# Patient Record
Sex: FEMALE | Race: WHITE | NOT HISPANIC OR LATINO | Employment: FULL TIME | URBAN - METROPOLITAN AREA
[De-identification: names, ages, dates, MRNs, and addresses within clinical notes are randomized per-mention and may not be internally consistent; named-entity substitution may affect disease eponyms.]

---

## 2019-09-30 ENCOUNTER — OFFICE VISIT (OUTPATIENT)
Dept: PRIMARY CARE CLINIC | Facility: CLINIC | Age: 59
End: 2019-09-30
Payer: COMMERCIAL

## 2019-09-30 ENCOUNTER — IMMUNIZATION (OUTPATIENT)
Dept: PHARMACY | Facility: CLINIC | Age: 59
End: 2019-09-30
Payer: COMMERCIAL

## 2019-09-30 VITALS
DIASTOLIC BLOOD PRESSURE: 86 MMHG | WEIGHT: 167.75 LBS | HEART RATE: 61 BPM | SYSTOLIC BLOOD PRESSURE: 138 MMHG | OXYGEN SATURATION: 98 % | HEIGHT: 67 IN | BODY MASS INDEX: 26.33 KG/M2 | TEMPERATURE: 98 F

## 2019-09-30 DIAGNOSIS — Z01.89 ENCOUNTER FOR ROUTINE LABORATORY TESTING: ICD-10-CM

## 2019-09-30 DIAGNOSIS — F33.2 SEVERE EPISODE OF RECURRENT MAJOR DEPRESSIVE DISORDER, WITHOUT PSYCHOTIC FEATURES: ICD-10-CM

## 2019-09-30 DIAGNOSIS — Z13.220 SCREENING CHOLESTEROL LEVEL: ICD-10-CM

## 2019-09-30 DIAGNOSIS — M54.2 CERVICALGIA: ICD-10-CM

## 2019-09-30 DIAGNOSIS — Z12.11 ENCOUNTER FOR SCREENING COLONOSCOPY: ICD-10-CM

## 2019-09-30 DIAGNOSIS — Z23 NEED FOR INFLUENZA VACCINATION: ICD-10-CM

## 2019-09-30 DIAGNOSIS — Z01.818 PREOP EXAMINATION: ICD-10-CM

## 2019-09-30 DIAGNOSIS — M85.89 OSTEOPENIA OF MULTIPLE SITES: ICD-10-CM

## 2019-09-30 DIAGNOSIS — Z12.31 SCREENING MAMMOGRAM, ENCOUNTER FOR: ICD-10-CM

## 2019-09-30 DIAGNOSIS — F41.9 ANXIETY: ICD-10-CM

## 2019-09-30 DIAGNOSIS — E66.3 OVERWEIGHT (BMI 25.0-29.9): ICD-10-CM

## 2019-09-30 DIAGNOSIS — Z23 NEED FOR SHINGLES VACCINE: ICD-10-CM

## 2019-09-30 DIAGNOSIS — Z00.00 ANNUAL PHYSICAL EXAM: Primary | ICD-10-CM

## 2019-09-30 PROBLEM — Z78.0 POSTMENOPAUSAL: Status: ACTIVE | Noted: 2019-09-30

## 2019-09-30 LAB
BACTERIA #/AREA URNS AUTO: NORMAL /HPF
BILIRUB UR QL STRIP: NEGATIVE
CLARITY UR REFRACT.AUTO: CLEAR
COLOR UR AUTO: ABNORMAL
GLUCOSE UR QL STRIP: NEGATIVE
HGB UR QL STRIP: ABNORMAL
KETONES UR QL STRIP: NEGATIVE
LEUKOCYTE ESTERASE UR QL STRIP: NEGATIVE
MICROSCOPIC COMMENT: NORMAL
NITRITE UR QL STRIP: NEGATIVE
PH UR STRIP: 6 [PH] (ref 5–8)
PROT UR QL STRIP: NEGATIVE
RBC #/AREA URNS AUTO: 1 /HPF (ref 0–4)
SP GR UR STRIP: 1 (ref 1–1.03)
SQUAMOUS #/AREA URNS AUTO: 1 /HPF
URN SPEC COLLECT METH UR: ABNORMAL
WBC #/AREA URNS AUTO: 1 /HPF (ref 0–5)

## 2019-09-30 PROCEDURE — 90471 FLU VACCINE (QUAD) GREATER THAN OR EQUAL TO 3YO PRESERVATIVE FREE IM: ICD-10-PCS | Mod: S$GLB,,, | Performed by: FAMILY MEDICINE

## 2019-09-30 PROCEDURE — 93010 EKG 12-LEAD: ICD-10-PCS | Mod: S$GLB,,, | Performed by: INTERNAL MEDICINE

## 2019-09-30 PROCEDURE — 81001 URINALYSIS AUTO W/SCOPE: CPT

## 2019-09-30 PROCEDURE — 90686 IIV4 VACC NO PRSV 0.5 ML IM: CPT | Mod: S$GLB,,, | Performed by: FAMILY MEDICINE

## 2019-09-30 PROCEDURE — 93005 EKG 12-LEAD: ICD-10-PCS | Mod: S$GLB,,, | Performed by: FAMILY MEDICINE

## 2019-09-30 PROCEDURE — 99386 PREV VISIT NEW AGE 40-64: CPT | Mod: 25,S$GLB,, | Performed by: FAMILY MEDICINE

## 2019-09-30 PROCEDURE — 99386 PR PREVENTIVE VISIT,NEW,40-64: ICD-10-PCS | Mod: 25,S$GLB,, | Performed by: FAMILY MEDICINE

## 2019-09-30 PROCEDURE — 93005 ELECTROCARDIOGRAM TRACING: CPT | Mod: S$GLB,,, | Performed by: FAMILY MEDICINE

## 2019-09-30 PROCEDURE — 99999 PR PBB SHADOW E&M-NEW PATIENT-LVL V: CPT | Mod: PBBFAC,,, | Performed by: FAMILY MEDICINE

## 2019-09-30 PROCEDURE — 93010 ELECTROCARDIOGRAM REPORT: CPT | Mod: S$GLB,,, | Performed by: INTERNAL MEDICINE

## 2019-09-30 PROCEDURE — 99999 PR PBB SHADOW E&M-NEW PATIENT-LVL V: ICD-10-PCS | Mod: PBBFAC,,, | Performed by: FAMILY MEDICINE

## 2019-09-30 PROCEDURE — 90471 IMMUNIZATION ADMIN: CPT | Mod: S$GLB,,, | Performed by: FAMILY MEDICINE

## 2019-09-30 PROCEDURE — 90686 FLU VACCINE (QUAD) GREATER THAN OR EQUAL TO 3YO PRESERVATIVE FREE IM: ICD-10-PCS | Mod: S$GLB,,, | Performed by: FAMILY MEDICINE

## 2019-09-30 RX ORDER — CITALOPRAM 10 MG/1
1 TABLET ORAL DAILY PRN
COMMUNITY
Start: 2013-01-01 | End: 2019-09-30 | Stop reason: SDUPTHER

## 2019-09-30 RX ORDER — CITALOPRAM 10 MG/1
10 TABLET ORAL DAILY
Qty: 90 TABLET | Refills: 3 | Status: SHIPPED | OUTPATIENT
Start: 2019-09-30 | End: 2020-06-02 | Stop reason: DRUGHIGH

## 2019-09-30 RX ORDER — CYCLOBENZAPRINE HCL 10 MG
TABLET ORAL
Refills: 0 | COMMUNITY
Start: 2019-09-17 | End: 2020-10-01 | Stop reason: SDUPTHER

## 2019-09-30 RX ORDER — NAPROXEN 500 MG/1
TABLET ORAL
Refills: 0 | COMMUNITY
Start: 2019-09-17 | End: 2021-06-03 | Stop reason: ALTCHOICE

## 2019-09-30 NOTE — PROGRESS NOTES
Two patient identifiers used. Allergies reviewed: Penicillins, Cephalexin. Injection x1 given. Patient tolerated well. VIS given.   Answers for HPI/ROS submitted by the patient on 9/26/2019   activity change: No  unexpected weight change: No  neck pain: Yes  hearing loss: No  rhinorrhea: No  trouble swallowing: No  eye discharge: No  visual disturbance: No  chest tightness: No  wheezing: No  chest pain: No  palpitations: No  blood in stool: No  constipation: No  vomiting: No  diarrhea: No  polydipsia: No  polyuria: No  difficulty urinating: Yes  hematuria: No  menstrual problem: No  dysuria: No  joint swelling: No  arthralgias: No  headaches: Yes  weakness: No  confusion: No  dysphoric mood: Yes

## 2019-09-30 NOTE — PATIENT INSTRUCTIONS

## 2019-09-30 NOTE — PROGRESS NOTES
Subjective:       Patient ID: Linda Kahn is a 59 y.o. female.    Chief Complaint: Annual Exam and Establish Care    58 yo female presents for annual physical exam.    She is new to [facility] having received care at other facilities (cannot remember the name, states her primary care provider moved around a lot).    She has a past medical history of cervical disc degeneration; work related injury on 9/15/2019 secondary to a slip and fall with resultant right knee injury (sees Dr. Shah at [facility]); postmenopausal; depression; osteopenia and overweight with BMI of 26.28.    She would like a refill on Celexa. She is not interested in seeing a psychiatrist but would be interested in talk therapy.    The following portions of the patient's history were reviewed and updated as appropriate: allergies, current medications, past family history, past medical history, past social history, past surgical history and problem list.      Review of Systems   Constitutional: Negative for activity change, appetite change, chills, diaphoresis, fatigue, fever and unexpected weight change.   HENT: Negative for congestion, dental problem, facial swelling, hearing loss, nosebleeds, postnasal drip, rhinorrhea, sinus pain, sore throat, tinnitus and trouble swallowing.    Eyes: Negative for pain, discharge, itching and visual disturbance.   Respiratory: Negative for apnea, cough, choking, chest tightness, shortness of breath, wheezing and stridor.    Cardiovascular: Negative for chest pain, palpitations and leg swelling.   Gastrointestinal: Negative for abdominal distention, abdominal pain, blood in stool, constipation, diarrhea, nausea, rectal pain and vomiting.   Endocrine: Negative for cold intolerance, heat intolerance, polydipsia and polyuria.   Genitourinary: Positive for difficulty urinating. Negative for dysuria, frequency, hematuria, menstrual problem and urgency.   Musculoskeletal: Positive for arthralgias and neck pain.  "Negative for gait problem, joint swelling, myalgias and neck stiffness.   Skin: Negative for color change and rash.   Neurological: Positive for headaches. Negative for dizziness, tremors, seizures, syncope, facial asymmetry and weakness.   Hematological: Negative for adenopathy. Does not bruise/bleed easily.   Psychiatric/Behavioral: Positive for dysphoric mood. Negative for agitation, confusion, hallucinations, self-injury and suicidal ideas. The patient is not hyperactive.        Objective:       Vitals:    09/30/19 0810   BP: 138/86   Pulse: 61   Temp: 98.4 °F (36.9 °C)   TempSrc: Oral   SpO2: 98%   Weight: 76.1 kg (167 lb 12.3 oz)   Height: 5' 7" (1.702 m)     Physical Exam   Constitutional: She is oriented to person, place, and time. She appears well-developed and well-nourished. No distress.   HENT:   Head: Normocephalic and atraumatic.   Right Ear: External ear normal.   Left Ear: External ear normal.   Eyes: Pupils are equal, round, and reactive to light. Conjunctivae and EOM are normal. Right eye exhibits no discharge. Left eye exhibits no discharge. No scleral icterus.   Neck: Normal range of motion. Neck supple. No thyromegaly present.   Cardiovascular: Normal rate, regular rhythm, normal heart sounds and intact distal pulses. Exam reveals no gallop and no friction rub.   No murmur heard.  Pulmonary/Chest: Effort normal and breath sounds normal. No respiratory distress. She exhibits no tenderness.   Abdominal: Soft. Bowel sounds are normal. She exhibits no distension and no mass. There is no tenderness. There is no rebound and no guarding.   Lymphadenopathy:     She has no cervical adenopathy.   Neurological: She is alert and oriented to person, place, and time.   Skin: Skin is warm. Capillary refill takes less than 2 seconds. No rash noted. She is not diaphoretic.   Psychiatric: Judgment and thought content normal.   Maintains good eye contact.       Assessment:       1. Annual physical exam    2. " Severe episode of recurrent major depressive disorder, without psychotic features    3. Anxiety    4. Cervicalgia    5. Osteopenia of multiple sites    6. Overweight (BMI 25.0-29.9)    7. Screening mammogram, encounter for    8. Preop examination    9. Encounter for screening colonoscopy    10. Encounter for routine laboratory testing    11. Screening cholesterol level    12. Need for influenza vaccination    13. Need for shingles vaccine          DEXA 8/30/2013  Impression    Findings compatible with borderline osteopenia of the lumbar spine  With increased fracture risk.  Findings compatible with osteopenia of the hips, with  increased  fracture risk.    Plan:       1. Annual physical exam  Age appropriate prevention guidelines implemented, unless patient refused  Encourage Advanced directives  Labs ordered   Immunizations reviewed. Encourage yearly influenza vaccine.  Patient Counseling:  --Nutrition: Encourage healthy food choices, adequate water intake, moderate sodium/caffeine intake, diet low in saturated fat and cholesterol. Importance of caloric balance and sufficient intake of fresh fruits, vegetables, fiber, calcium, iron.  --Exercise: Stressed the importance of regular exercise.   --Substance Abuse: Abstinence from tobacco products. No more than 1 alcoholic drink per day in women; but avoid if any addictive personality traits. Avoid illicit drugs.  --Sexuality: Safe sex practices to avoid sexually transmitted diseases; careful partner selection or abstinence per patient's preference  --Injury prevention: encourage safety belts, safety helmets, smoke detector.  --Dental health: Discussed importance of regular tooth brushing X2 daily, flossing X1 daily, and routine dental visits.  --Encourage use of sun screen, wear sun protective clothing  --Encourage routine eye exams    2. Severe episode of recurrent major depressive disorder, without psychotic features  3. Anxiety  Roman 7 Score of 13 and PHQ 9 score of  15  -     citalopram (CELEXA) 10 MG tablet; Take 1 tablet (10 mg total) by mouth once daily.  Dispense: 90 tablet; Refill: 3  -     Ambulatory referral to Psychology: CBT would be beneficial    4. Cervicalgia  Manages conservatively.     5. Osteopenia of multiple sites  Recommend 1200 mg of Calcium daily (in foods or an over the counter supplement) and at least 800-1000 U of vitamin D daily (over the counter medication).  Physical activity. Limit/ avoid alcohol. Abstinence from nicotine products.    6. Overweight (BMI 25.0-29.9)  Encourage healthy eating options and exercise.    7. Screening mammogram, encounter for  -     Mammo Digital Screening Bilat w/ Abe; Future    8. Preop examination   -     IN OFFICE EKG 12-LEAD (to Muse): HR 58. Sinus bradycardia. No acute ischemic changes.  She states that she is able to walk up 2 flights of stairs at a normal speed without shortness of breath or chest pain. She denies any known family history of complications with anesthesia.  She has no personal history of complications with anesthesia.    9. Encounter for screening colonoscopy  -     Case request GI: COLONOSCOPY  Patient appears capable of doing at least 4 METs of activity. There is no chest pain and no increasing shortness of breath with activity. There is no evidence of symptomatic obstructive sleep apnea. Labs pending.  No over-the-counter medications including naproxen for 1 week prior to the procedure.  Nothing to eat or drink after midnight.  Please follow the directions for the bowel prep.  May resume all medications after the procedure once able to tolerate oral diet.    10. Encounter for routine laboratory testing  -     CBC Without Differential; Future; for cell count  -     Comprehensive metabolic panel; Future; for liver/kidney and electrolyte testing  -     Urinalysis; screen for hematuria  -     TSH; Future; screen for thyroid disorder    11. Screening cholesterol level  -     Lipid panel; Future    12.  Need for influenza vaccination  -     Influenza - Quadrivalent (6 months+) (PF)    13. Need for shingles vaccine  Prescription for shingles written on a hard copy script    Disclaimer: This note has been generated using voice-recognition software. There may be typographical errors that have been missed during proof-reading

## 2019-10-04 ENCOUNTER — HOSPITAL ENCOUNTER (OUTPATIENT)
Dept: RADIOLOGY | Facility: HOSPITAL | Age: 59
Discharge: HOME | End: 2019-10-04
Attending: FAMILY MEDICINE
Payer: COMMERCIAL

## 2019-10-04 DIAGNOSIS — Z12.31 SCREENING MAMMOGRAM, ENCOUNTER FOR: ICD-10-CM

## 2019-10-04 PROCEDURE — 77063 BREAST TOMOSYNTHESIS BI: CPT | Mod: TC,PN

## 2019-10-04 PROCEDURE — 77067 SCR MAMMO BI INCL CAD: CPT | Mod: 26,,, | Performed by: RADIOLOGY

## 2019-10-04 PROCEDURE — 77067 MAMMO DIGITAL SCREENING BILAT WITH TOMOSYNTHESIS_CAD: ICD-10-PCS | Mod: 26,,, | Performed by: RADIOLOGY

## 2019-10-04 PROCEDURE — 77063 BREAST TOMOSYNTHESIS BI: CPT | Mod: 26,,, | Performed by: RADIOLOGY

## 2019-10-04 PROCEDURE — 77063 MAMMO DIGITAL SCREENING BILAT WITH TOMOSYNTHESIS_CAD: ICD-10-PCS | Mod: 26,,, | Performed by: RADIOLOGY

## 2019-10-07 ENCOUNTER — DOCUMENTATION ONLY (OUTPATIENT)
Dept: PRIMARY CARE CLINIC | Facility: CLINIC | Age: 59
End: 2019-10-07

## 2019-10-07 NOTE — PROGRESS NOTES
Patient Review of Clinical Information     Problems   This clinical information was not verified.   Medications   This clinical information was not verified, but there are updates pending review:   Reported Medications Start Date Reported By  Comments   REHAN SCOTTRIN (ibuprofen) 800 mg/8 mL (100 mg/mL) Solr 10/6/2019 Linda Kahn would like a prescription please. thank you.   Allergies   This clinical information was not verified.

## 2019-12-26 ENCOUNTER — TELEPHONE (OUTPATIENT)
Dept: PRIMARY CARE CLINIC | Facility: CLINIC | Age: 59
End: 2019-12-26

## 2019-12-26 NOTE — TELEPHONE ENCOUNTER
----- Message from Frankie Bolanos sent at 12/26/2019  3:35 PM CST -----  Contact: self   Patient would like to get medical advice.  Symptoms (please be specific):  Ear pain and sore throat  How long has patient had these symptoms:  1 day  Pharmacy name and phone # (copy/paste from chart):  [id number] [address] [phone] (Phone)  [phone] (Fax)  Any drug allergies (copy/paste from chart):      Would the patient rather a call back or a response via [facility]?:  Call back  Comments:

## 2019-12-27 ENCOUNTER — OFFICE VISIT (OUTPATIENT)
Dept: PRIMARY CARE CLINIC | Facility: CLINIC | Age: 59
End: 2019-12-27
Payer: COMMERCIAL

## 2019-12-27 VITALS
OXYGEN SATURATION: 97 % | HEIGHT: 67 IN | WEIGHT: 167.13 LBS | HEART RATE: 78 BPM | DIASTOLIC BLOOD PRESSURE: 84 MMHG | BODY MASS INDEX: 26.23 KG/M2 | TEMPERATURE: 99 F | SYSTOLIC BLOOD PRESSURE: 122 MMHG

## 2019-12-27 DIAGNOSIS — J06.9 UPPER RESPIRATORY TRACT INFECTION, UNSPECIFIED TYPE: Primary | ICD-10-CM

## 2019-12-27 LAB
CTP QC/QA: YES
S PYO RRNA THROAT QL PROBE: NEGATIVE

## 2019-12-27 PROCEDURE — 87880 STREP A ASSAY W/OPTIC: CPT | Mod: QW,S$GLB,, | Performed by: NURSE PRACTITIONER

## 2019-12-27 PROCEDURE — 87880 POCT RAPID STREP A: ICD-10-PCS | Mod: QW,S$GLB,, | Performed by: NURSE PRACTITIONER

## 2019-12-27 PROCEDURE — 3008F BODY MASS INDEX DOCD: CPT | Mod: CPTII,S$GLB,, | Performed by: NURSE PRACTITIONER

## 2019-12-27 PROCEDURE — 3008F PR BODY MASS INDEX (BMI) DOCUMENTED: ICD-10-PCS | Mod: CPTII,S$GLB,, | Performed by: NURSE PRACTITIONER

## 2019-12-27 PROCEDURE — 99213 OFFICE O/P EST LOW 20 MIN: CPT | Mod: 25,S$GLB,, | Performed by: NURSE PRACTITIONER

## 2019-12-27 PROCEDURE — 99999 PR PBB SHADOW E&M-EST. PATIENT-LVL IV: ICD-10-PCS | Mod: PBBFAC,,, | Performed by: NURSE PRACTITIONER

## 2019-12-27 PROCEDURE — 99213 PR OFFICE/OUTPT VISIT, EST, LEVL III, 20-29 MIN: ICD-10-PCS | Mod: 25,S$GLB,, | Performed by: NURSE PRACTITIONER

## 2019-12-27 PROCEDURE — 99999 PR PBB SHADOW E&M-EST. PATIENT-LVL IV: CPT | Mod: PBBFAC,,, | Performed by: NURSE PRACTITIONER

## 2019-12-27 RX ORDER — FLUTICASONE PROPIONATE 50 MCG
1 SPRAY, SUSPENSION (ML) NASAL DAILY
Qty: 16 G | Refills: 0 | Status: SHIPPED | OUTPATIENT
Start: 2019-12-27

## 2019-12-27 RX ORDER — AZITHROMYCIN 250 MG/1
TABLET, FILM COATED ORAL
Qty: 6 TABLET | Refills: 0 | Status: SHIPPED | OUTPATIENT
Start: 2019-12-27 | End: 2021-06-03 | Stop reason: ALTCHOICE

## 2019-12-27 RX ORDER — AZITHROMYCIN 250 MG/1
TABLET, FILM COATED ORAL
Qty: 6 TABLET | Refills: 0 | Status: SHIPPED | OUTPATIENT
Start: 2019-12-27 | End: 2019-12-27

## 2019-12-27 RX ORDER — PROMETHAZINE HYDROCHLORIDE AND DEXTROMETHORPHAN HYDROBROMIDE 6.25; 15 MG/5ML; MG/5ML
5 SYRUP ORAL NIGHTLY PRN
Qty: 120 ML | Refills: 0 | Status: SHIPPED | OUTPATIENT
Start: 2019-12-27 | End: 2020-01-20

## 2019-12-27 RX ORDER — PROMETHAZINE HYDROCHLORIDE AND DEXTROMETHORPHAN HYDROBROMIDE 6.25; 15 MG/5ML; MG/5ML
5 SYRUP ORAL NIGHTLY PRN
Qty: 120 ML | Refills: 0 | Status: SHIPPED | OUTPATIENT
Start: 2019-12-27 | End: 2019-12-27

## 2019-12-27 RX ORDER — FLUTICASONE PROPIONATE 50 MCG
1 SPRAY, SUSPENSION (ML) NASAL DAILY
Qty: 16 G | Refills: 0 | Status: SHIPPED | OUTPATIENT
Start: 2019-12-27 | End: 2019-12-27

## 2019-12-27 NOTE — PROGRESS NOTES
Subjective:       Patient ID: Linda Kahn is a 59 y.o. female.    Chief Complaint: Sore Throat and Otalgia    URI    This is a new problem. The current episode started in the past 7 days. The problem has been gradually worsening. The maximum temperature recorded prior to her arrival was 100.4 - 100.9 F. The fever has been present for 1 to 2 days. Associated symptoms include coughing, ear pain, headaches, neck pain, a sore throat and swollen glands. Pertinent negatives include no abdominal pain, chest pain, congestion, diarrhea, dysuria, joint pain, joint swelling, nausea, plugged ear sensation, rash, rhinorrhea, sinus pain, sneezing, vomiting or wheezing. She has tried nothing for the symptoms.     Review of Systems   Constitutional: Positive for fatigue and fever.   HENT: Positive for ear pain and sore throat. Negative for congestion, rhinorrhea, sinus pain and sneezing.    Eyes: Negative for visual disturbance.   Respiratory: Positive for cough. Negative for wheezing.    Cardiovascular: Negative for chest pain.   Gastrointestinal: Negative for abdominal pain, diarrhea, nausea and vomiting.   Genitourinary: Negative for dysuria.   Musculoskeletal: Positive for neck pain. Negative for joint pain.   Skin: Negative for rash.   Neurological: Positive for headaches.   Psychiatric/Behavioral: Negative for confusion.       Objective:      Physical Exam   Constitutional: She appears well-developed and well-nourished. She appears ill. No distress.   HENT:   Head: Normocephalic and atraumatic.   Nose: Nose normal.   Mouth/Throat: Oropharyngeal exudate and posterior oropharyngeal erythema present. Tonsils are 2+ on the right. Tonsils are 2+ on the left. Tonsillar exudate.   Eyes: No scleral icterus.   Neck: Normal range of motion. Neck supple.   Cardiovascular: Normal rate, regular rhythm and normal heart sounds.   Pulmonary/Chest: Effort normal and breath sounds normal. No stridor. No respiratory distress. She has no  wheezes. She has no rales.   Lymphadenopathy:     She has cervical adenopathy.   Skin: Skin is warm and dry. She is not diaphoretic.   Psychiatric: She has a normal mood and affect. Her behavior is normal.   Vitals reviewed.      Assessment:       1. Upper respiratory tract infection, unspecified type        Plan:   Linda was seen today for sore throat and otalgia.    Diagnoses and all orders for this visit:    Upper respiratory tract infection, unspecified type  -     POCT Rapid Strep A  -     Discontinue: azithromycin (Z-DULCE) 250 MG tablet; 2 tabs on day 1 then 1 tab on days 2-5  -     Throat culture  -     Discontinue: promethazine-dextromethorphan (PROMETHAZINE-DM) 6.25-15 mg/5 mL Syrp; Take 5 mLs by mouth nightly as needed.  -     Discontinue: fluticasone propionate (FLONASE) 50 mcg/actuation nasal spray; 1 spray (50 mcg total) by Each Nostril route once daily.  -     azithromycin (Z-DULCE) 250 MG tablet; Take 2 tablets on day 1 then 1 tablet on days 2-5  -     fluticasone propionate (FLONASE) 50 mcg/actuation nasal spray; instill 1 spray (50 mcg total) by Each Nostril route once daily.  -     promethazine-dextromethorphan (PROMETHAZINE-DM) 6.25-15 mg/5 mL Syrp; Take 5 mLs by mouth nightly as needed.            Pt has been given instructions populated from [employer] database and has verbalized understanding of the after visit summary and information contained wherein.    Follow up with a primary care provider. May go to ER for acute shortness of breath, lightheadedness, fever, or any other emergent complaints or changes in condition.

## 2019-12-27 NOTE — PATIENT INSTRUCTIONS
- Warm salt water gargles every 2 hours as needed  - Alternate tylenol and advil as needed for aches pains.   - Use your left hand to instill Flonase into your right nostril. After you spray the solution, bend your head forward and to the right.  Then, using your right hand, instill Flonase into your left nostril, then tilt your head forward and to the left.

## 2019-12-30 ENCOUNTER — TELEPHONE (OUTPATIENT)
Dept: PRIMARY CARE CLINIC | Facility: CLINIC | Age: 59
End: 2019-12-30

## 2019-12-30 NOTE — TELEPHONE ENCOUNTER
"Spoke with Charline with lab client services; "Wrong specimen container for Upper respiratory culture, specimen will need to be recollected."  "

## 2019-12-30 NOTE — TELEPHONE ENCOUNTER
----- Message from Brittni Enriquez sent at 12/28/2019  6:04 AM CST -----  Regarding: Lab Client Services  Contact: [phone]  Hi, my name is Brittni I work in the Lab Client Services. We had a problem with some lab work on this patient. If someone from your office could call us at [phone] or ext. [phone] that would be great. Anyone in my department can help. Thank you

## 2019-12-30 NOTE — TELEPHONE ENCOUNTER
Spoke with our lab technicians, verified the red top tube is current specimen container for upper respiratory culture; notified provider.

## 2020-03-16 ENCOUNTER — TELEPHONE (OUTPATIENT)
Dept: PRIMARY CARE CLINIC | Facility: CLINIC | Age: 60
End: 2020-03-16

## 2020-03-16 NOTE — TELEPHONE ENCOUNTER
----- Message from Suad Bolanos sent at 3/16/2020  2:12 PM CDT -----  Contact: self    Requesting an RX refill or new RX.  Is this a refill or new RX:    RX name and strength: citalopram (CELEXA) 10 MG tablet  Directions (copy/paste from chart):    Is this a 30 day or 90 day RX:  90  Local pharmacy or mail order pharmacy:  [id number] [address] [phone] (Phone)  [phone] (Fax)  Pharmacy name and phone # (copy/paste from chart):     Comments:  Please call patient once refill has been sent over

## 2020-06-02 ENCOUNTER — OFFICE VISIT (OUTPATIENT)
Dept: PRIMARY CARE CLINIC | Facility: CLINIC | Age: 60
End: 2020-06-02
Payer: MEDICAID

## 2020-06-02 DIAGNOSIS — F43.29 ADJUSTMENT DISORDER WITH MIXED EMOTIONAL FEATURES: ICD-10-CM

## 2020-06-02 DIAGNOSIS — F32.A ANXIETY AND DEPRESSION: Primary | ICD-10-CM

## 2020-06-02 DIAGNOSIS — F41.9 ANXIETY AND DEPRESSION: Primary | ICD-10-CM

## 2020-06-02 PROCEDURE — 99213 OFFICE O/P EST LOW 20 MIN: CPT | Mod: 95,,, | Performed by: FAMILY MEDICINE

## 2020-06-02 PROCEDURE — 99213 PR OFFICE/OUTPT VISIT, EST, LEVL III, 20-29 MIN: ICD-10-PCS | Mod: 95,,, | Performed by: FAMILY MEDICINE

## 2020-06-02 RX ORDER — CITALOPRAM 20 MG/1
20 TABLET, FILM COATED ORAL DAILY
Qty: 90 TABLET | Refills: 3 | Status: SHIPPED | OUTPATIENT
Start: 2020-06-02 | End: 2021-07-19 | Stop reason: SDUPTHER

## 2020-06-02 RX ORDER — ALPRAZOLAM 0.5 MG/1
0.5 TABLET ORAL DAILY PRN
Qty: 14 TABLET | Refills: 0 | Status: SHIPPED | OUTPATIENT
Start: 2020-06-02 | End: 2020-10-01 | Stop reason: SDUPTHER
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
## 2020-09-06 DIAGNOSIS — F41.9 ANXIETY AND DEPRESSION: ICD-10-CM

## 2020-09-06 DIAGNOSIS — F43.29 ADJUSTMENT DISORDER WITH MIXED EMOTIONAL FEATURES: ICD-10-CM

## 2020-09-06 DIAGNOSIS — F32.A ANXIETY AND DEPRESSION: ICD-10-CM

## 2020-09-07 RX ORDER — ALPRAZOLAM 0.5 MG/1
0.5 TABLET ORAL DAILY PRN
Qty: 14 TABLET | Refills: 0 | OUTPATIENT
Start: 2020-09-07

## 2020-09-08 DIAGNOSIS — F43.29 ADJUSTMENT DISORDER WITH MIXED EMOTIONAL FEATURES: ICD-10-CM

## 2020-09-08 DIAGNOSIS — F32.A ANXIETY AND DEPRESSION: ICD-10-CM

## 2020-09-08 DIAGNOSIS — F41.9 ANXIETY AND DEPRESSION: ICD-10-CM

## 2020-09-08 RX ORDER — ALPRAZOLAM 0.5 MG/1
0.5 TABLET ORAL DAILY PRN
Qty: 14 TABLET | Refills: 0 | OUTPATIENT
Start: 2020-09-08

## 2020-09-30 ENCOUNTER — TELEPHONE (OUTPATIENT)
Dept: PRIMARY CARE CLINIC | Facility: CLINIC | Age: 60
End: 2020-09-30

## 2020-09-30 DIAGNOSIS — Z12.31 SCREENING MAMMOGRAM, ENCOUNTER FOR: Primary | ICD-10-CM

## 2020-09-30 NOTE — TELEPHONE ENCOUNTER
----- Message from Tanja Moreno sent at 9/30/2020  7:42 AM CDT -----  Regarding: Mammo  Contact: Patient @ [phone]  Caller is requesting to schedule their annual screening mammogram appointment. Order is not listed in Epic.  Please enter order and contact patient to schedule.  Where would they like the mammogram performed?:  [place] on 09/30/2020  Would the patient rather receive a phone call back or a response via [facility]?:  Call  Additional information:  Would Like have her Pap Done on the same day

## 2020-09-30 NOTE — TELEPHONE ENCOUNTER
Last mammogram 10/04/2019  [facility] 09/30/2019    Next appt 10/01/2020    Spoke with patient, informed that mammography does not have available appointments at [facility] today, a pap can be at annual visit tomorrow.  Dr. Mcknight will order mammogram and we can schedule before she leaves tomorrow.  Patient verbalized understanding.

## 2020-10-01 ENCOUNTER — IMMUNIZATION (OUTPATIENT)
Dept: PHARMACY | Facility: CLINIC | Age: 60
End: 2020-10-01

## 2020-10-01 ENCOUNTER — IMMUNIZATION (OUTPATIENT)
Dept: PHARMACY | Facility: CLINIC | Age: 60
End: 2020-10-01
Payer: MEDICAID

## 2020-10-01 ENCOUNTER — OFFICE VISIT (OUTPATIENT)
Dept: PRIMARY CARE CLINIC | Facility: CLINIC | Age: 60
End: 2020-10-01
Payer: MEDICAID

## 2020-10-01 ENCOUNTER — LAB VISIT (OUTPATIENT)
Dept: LAB | Facility: HOSPITAL | Age: 60
End: 2020-10-01
Attending: FAMILY MEDICINE
Payer: MEDICAID

## 2020-10-01 VITALS
HEART RATE: 56 BPM | WEIGHT: 178.13 LBS | TEMPERATURE: 99 F | BODY MASS INDEX: 27.96 KG/M2 | DIASTOLIC BLOOD PRESSURE: 80 MMHG | SYSTOLIC BLOOD PRESSURE: 122 MMHG | OXYGEN SATURATION: 100 % | HEIGHT: 67 IN

## 2020-10-01 DIAGNOSIS — Z11.59 NEED FOR HEPATITIS C SCREENING TEST: ICD-10-CM

## 2020-10-01 DIAGNOSIS — Z00.00 LABORATORY EXAM ORDERED AS PART OF ROUTINE GENERAL MEDICAL EXAMINATION: ICD-10-CM

## 2020-10-01 DIAGNOSIS — M54.2 CERVICALGIA: ICD-10-CM

## 2020-10-01 DIAGNOSIS — Z01.84 IMMUNITY STATUS TESTING: ICD-10-CM

## 2020-10-01 DIAGNOSIS — R00.2 PALPITATION: ICD-10-CM

## 2020-10-01 DIAGNOSIS — Z12.31 SCREENING MAMMOGRAM, ENCOUNTER FOR: ICD-10-CM

## 2020-10-01 DIAGNOSIS — M85.89 OSTEOPENIA OF MULTIPLE SITES: ICD-10-CM

## 2020-10-01 DIAGNOSIS — G44.229 CHRONIC TENSION-TYPE HEADACHE, NOT INTRACTABLE: ICD-10-CM

## 2020-10-01 DIAGNOSIS — F41.9 ANXIETY AND DEPRESSION: ICD-10-CM

## 2020-10-01 DIAGNOSIS — R33.9 URINARY RETENTION: ICD-10-CM

## 2020-10-01 DIAGNOSIS — F17.200 VAPING NICOTINE DEPENDENCE, NON-TOBACCO PRODUCT: ICD-10-CM

## 2020-10-01 DIAGNOSIS — Z78.0 POSTMENOPAUSAL: ICD-10-CM

## 2020-10-01 DIAGNOSIS — R07.89 ATYPICAL CHEST PAIN: ICD-10-CM

## 2020-10-01 DIAGNOSIS — Z12.11 SCREENING FOR MALIGNANT NEOPLASM OF COLON: ICD-10-CM

## 2020-10-01 DIAGNOSIS — G89.29 CHRONIC MUSCULOSKELETAL PAIN: ICD-10-CM

## 2020-10-01 DIAGNOSIS — F32.A ANXIETY AND DEPRESSION: ICD-10-CM

## 2020-10-01 DIAGNOSIS — Z01.89 ENCOUNTER FOR ROUTINE LABORATORY TESTING: ICD-10-CM

## 2020-10-01 DIAGNOSIS — M79.18 CHRONIC MUSCULOSKELETAL PAIN: ICD-10-CM

## 2020-10-01 DIAGNOSIS — Z00.00 ANNUAL PHYSICAL EXAM: Primary | ICD-10-CM

## 2020-10-01 DIAGNOSIS — Z01.419 WOMEN'S ANNUAL ROUTINE GYNECOLOGICAL EXAMINATION: ICD-10-CM

## 2020-10-01 LAB
ALBUMIN SERPL BCP-MCNC: 3.9 G/DL (ref 3.5–5.2)
ALP SERPL-CCNC: 82 U/L (ref 55–135)
ALT SERPL W/O P-5'-P-CCNC: 20 U/L (ref 10–44)
ANION GAP SERPL CALC-SCNC: 11 MMOL/L (ref 8–16)
AST SERPL-CCNC: 23 U/L (ref 10–40)
BILIRUB SERPL-MCNC: 0.4 MG/DL (ref 0.1–1)
BILIRUB UR QL STRIP: NEGATIVE
BUN SERPL-MCNC: 16 MG/DL (ref 6–20)
CALCIUM SERPL-MCNC: 8.9 MG/DL (ref 8.7–10.5)
CHLORIDE SERPL-SCNC: 102 MMOL/L (ref 95–110)
CHOLEST SERPL-MCNC: 267 MG/DL (ref 120–199)
CHOLEST/HDLC SERPL: 3.2 {RATIO} (ref 2–5)
CLARITY UR REFRACT.AUTO: CLEAR
CO2 SERPL-SCNC: 27 MMOL/L (ref 23–29)
COLOR UR AUTO: NORMAL
CREAT SERPL-MCNC: 0.7 MG/DL (ref 0.5–1.4)
ERYTHROCYTE [DISTWIDTH] IN BLOOD BY AUTOMATED COUNT: 13.6 % (ref 11.5–14.5)
EST. GFR  (AFRICAN AMERICAN): >60 ML/MIN/1.73 M^2
EST. GFR  (NON AFRICAN AMERICAN): >60 ML/MIN/1.73 M^2
ESTIMATED AVG GLUCOSE: 103 MG/DL (ref 68–131)
GLUCOSE SERPL-MCNC: 90 MG/DL (ref 70–110)
GLUCOSE UR QL STRIP: NEGATIVE
HBA1C MFR BLD HPLC: 5.2 % (ref 4–5.6)
HCT VFR BLD AUTO: 39 % (ref 37–48.5)
HDLC SERPL-MCNC: 83 MG/DL (ref 40–75)
HDLC SERPL: 31.1 % (ref 20–50)
HGB BLD-MCNC: 12.6 G/DL (ref 12–16)
HGB UR QL STRIP: NEGATIVE
KETONES UR QL STRIP: NEGATIVE
LDLC SERPL CALC-MCNC: 166 MG/DL (ref 63–159)
LEUKOCYTE ESTERASE UR QL STRIP: NEGATIVE
MCH RBC QN AUTO: 29.6 PG (ref 27–31)
MCHC RBC AUTO-ENTMCNC: 32.3 G/DL (ref 32–36)
MCV RBC AUTO: 92 FL (ref 82–98)
NITRITE UR QL STRIP: NEGATIVE
NONHDLC SERPL-MCNC: 184 MG/DL
PH UR STRIP: 6 [PH] (ref 5–8)
PLATELET # BLD AUTO: 220 K/UL (ref 150–350)
PMV BLD AUTO: 11.9 FL (ref 9.2–12.9)
POTASSIUM SERPL-SCNC: 3.5 MMOL/L (ref 3.5–5.1)
PROT SERPL-MCNC: 7.4 G/DL (ref 6–8.4)
PROT UR QL STRIP: NEGATIVE
RBC # BLD AUTO: 4.26 M/UL (ref 4–5.4)
SARS-COV-2 IGG SERPLBLD QL IA.RAPID: NEGATIVE
SODIUM SERPL-SCNC: 140 MMOL/L (ref 136–145)
SP GR UR STRIP: 1.01 (ref 1–1.03)
TRIGL SERPL-MCNC: 90 MG/DL (ref 30–150)
TSH SERPL DL<=0.005 MIU/L-ACNC: 0.75 UIU/ML (ref 0.4–4)
URN SPEC COLLECT METH UR: NORMAL
WBC # BLD AUTO: 5.29 K/UL (ref 3.9–12.7)

## 2020-10-01 PROCEDURE — 93005 ELECTROCARDIOGRAM TRACING: CPT | Mod: PBBFAC,PN | Performed by: INTERNAL MEDICINE

## 2020-10-01 PROCEDURE — 86803 HEPATITIS C AB TEST: CPT

## 2020-10-01 PROCEDURE — 93010 EKG 12-LEAD: ICD-10-PCS | Mod: S$PBB,,, | Performed by: INTERNAL MEDICINE

## 2020-10-01 PROCEDURE — 81003 URINALYSIS AUTO W/O SCOPE: CPT

## 2020-10-01 PROCEDURE — 85027 COMPLETE CBC AUTOMATED: CPT

## 2020-10-01 PROCEDURE — 87086 URINE CULTURE/COLONY COUNT: CPT

## 2020-10-01 PROCEDURE — 86769 SARS-COV-2 COVID-19 ANTIBODY: CPT

## 2020-10-01 PROCEDURE — 99215 OFFICE O/P EST HI 40 MIN: CPT | Mod: PBBFAC,PN,25 | Performed by: FAMILY MEDICINE

## 2020-10-01 PROCEDURE — 93010 ELECTROCARDIOGRAM REPORT: CPT | Mod: S$PBB,,, | Performed by: INTERNAL MEDICINE

## 2020-10-01 PROCEDURE — 99213 PR OFFICE/OUTPT VISIT, EST, LEVL III, 20-29 MIN: ICD-10-PCS | Mod: S$PBB,,, | Performed by: FAMILY MEDICINE

## 2020-10-01 PROCEDURE — 80053 COMPREHEN METABOLIC PANEL: CPT

## 2020-10-01 PROCEDURE — 83036 HEMOGLOBIN GLYCOSYLATED A1C: CPT

## 2020-10-01 PROCEDURE — 84443 ASSAY THYROID STIM HORMONE: CPT

## 2020-10-01 PROCEDURE — 99999 PR PBB SHADOW E&M-EST. PATIENT-LVL V: ICD-10-PCS | Mod: PBBFAC,,, | Performed by: FAMILY MEDICINE

## 2020-10-01 PROCEDURE — 80061 LIPID PANEL: CPT

## 2020-10-01 PROCEDURE — 99999 PR PBB SHADOW E&M-EST. PATIENT-LVL V: CPT | Mod: PBBFAC,,, | Performed by: FAMILY MEDICINE

## 2020-10-01 PROCEDURE — 99213 OFFICE O/P EST LOW 20 MIN: CPT | Mod: S$PBB,,, | Performed by: FAMILY MEDICINE

## 2020-10-01 RX ORDER — ALPRAZOLAM 0.5 MG/1
0.5 TABLET ORAL DAILY PRN
Qty: 30 TABLET | Refills: 0 | Status: SHIPPED | OUTPATIENT
Start: 2020-10-01 | End: 2021-06-03 | Stop reason: SDUPTHER

## 2020-10-01 RX ORDER — CYCLOBENZAPRINE HCL 10 MG
10 TABLET ORAL 3 TIMES DAILY PRN
Qty: 90 TABLET | Refills: 5 | Status: SHIPPED | OUTPATIENT
Start: 2020-10-01 | End: 2022-10-17

## 2020-10-01 NOTE — PROGRESS NOTES
"Subjective:       Patient ID: Linda Kahn is a 60 y.o. female.    Chief Complaint: Annual Exam and Urinary retention, Headache & Palpitation.      59 yo female presents for annual physical exam.    She has a past medical history of cervical disc degeneration; work related injury on 9/15/2019 secondary to a slip and fall with resultant right knee injury (sees Dr. Shah at [facility]); postmenopausal; anxiety/depression; osteopenia and overweight with BMI of 27.    She reports no medication side effects. Feels like Celexa has been helping with anxiety since increasing from 10 mg daily to 20 mg daily; reports panice attacks and requesting refill on Xanax. She used Xanax sparingly while waiting for Celexa to initially reach maximum therapeutic efficacy. She plans to see psychology and is open to behavioral changes to assist with anxiety/ panic.    Lipid disorders/ASCVD risk (ages >/= 45 or >/= 20 if increased risk ):  Ordered  DM (>45y yearly or if obese, HTN):  Ordered  Hepatitis C:  Ordered  STD screening:  Decline  Breast Cancer (40-50y discretion of pt, 50-74y every 1-2 years):  Up to date  Cervical Cancer (Pap Smear ages 21-65 every 3 years or Pap + HPV q5 years after 30 years of age):  Not current; due today  She has yet to get colonoscopy done    - Urinary retention.  This is a long standing issue that has seems to only occur with drinking beer. No persistent abdominal pain. No blood in urine. No flank pain. No nausea or vomiting. She reports history of pelvic surgeries including oophorectomy.    - Headaches X 1 month persistently; off and on for years, lasting up to 3-7 days; severe and cannot function; sleeps to help alleviate headache. Located in retrooribtal, vertex and occipital regions. Photophobia. No phonophobia. No vomiting. Worsening vision acuity. Seeing a chiropractor.  Unsure about taking a daily headache pill. Open to seeing a specialist.    - Palpitation  Feels a "bruum" feeling in chest; " left sided. Daily, lasting seconds to mins. No persistent cheat pain. No significant reflux.  No orthopnea.  No PND.  No persistent shortness of breath.  She has a family history of cardiovascular disease.    The following portions of the patient's history were reviewed and updated as appropriate: allergies, current medications, past family history, past medical history, past social history, past surgical history and problem list.    Review of Systems   Constitutional: Negative for activity change, appetite change, chills, diaphoresis, fatigue, fever and unexpected weight change.   HENT: Negative for nasal congestion, dental problem, facial swelling, nosebleeds, postnasal drip, rhinorrhea, sore throat, tinnitus and trouble swallowing.    Eyes: Negative for pain, discharge, itching and visual disturbance.   Respiratory: Negative for apnea, chest tightness, shortness of breath, wheezing and stridor.    Cardiovascular: Positive for chest pain (atypical discomfort) and palpitations. Negative for leg swelling.   Gastrointestinal: Negative for abdominal distention, abdominal pain, constipation, diarrhea, nausea, rectal pain and vomiting.   Endocrine: Negative for cold intolerance, heat intolerance and polyuria.   Genitourinary: Negative for difficulty urinating, dysuria, frequency, hematuria and urgency.   Musculoskeletal: Negative for arthralgias, gait problem, myalgias, neck pain and neck stiffness.   Integumentary:  Negative for color change and rash.   Neurological: Positive for headaches. Negative for dizziness, tremors, seizures, syncope, facial asymmetry and weakness.   Hematological: Negative for adenopathy. Does not bruise/bleed easily.   Psychiatric/Behavioral: Negative for agitation, confusion, hallucinations, self-injury and suicidal ideas. The patient is nervous/anxious. The patient is not hyperactive.           Objective:       Vitals:    10/01/20 0807   BP: 122/80   Pulse: (!) 56   Temp: 98.5 °F (36.9 °C)  "  TempSrc: Oral   SpO2: 100%   Weight: 80.8 kg (178 lb 2.1 oz)   Height: 5' 7" (1.702 m)     Physical Exam  Constitutional:       General: She is not in acute distress.     Appearance: She is well-developed. She is not diaphoretic.   HENT:      Head: Normocephalic and atraumatic.      Right Ear: External ear normal.      Left Ear: External ear normal.   Eyes:      General: No scleral icterus.        Right eye: No discharge.         Left eye: No discharge.      Conjunctiva/sclera: Conjunctivae normal.      Pupils: Pupils are equal, round, and reactive to light.   Neck:      Musculoskeletal: Normal range of motion and neck supple.      Thyroid: No thyromegaly.   Cardiovascular:      Rate and Rhythm: Normal rate and regular rhythm.      Heart sounds: Normal heart sounds. No murmur. No friction rub. No gallop.    Pulmonary:      Effort: Pulmonary effort is normal. No respiratory distress.      Breath sounds: Normal breath sounds.   Chest:      Chest wall: No tenderness.   Abdominal:      General: Bowel sounds are normal. There is no distension.      Palpations: Abdomen is soft. There is no mass.      Tenderness: There is no abdominal tenderness. There is no guarding or rebound.   Musculoskeletal: Normal range of motion.   Lymphadenopathy:      Cervical: No cervical adenopathy.   Skin:     General: Skin is warm.      Capillary Refill: Capillary refill takes less than 2 seconds.      Findings: No rash.   Neurological:      Mental Status: She is alert and oriented to person, place, and time.      Cranial Nerves: No cranial nerve deficit.      Motor: No abnormal muscle tone.      Coordination: Coordination normal.      Deep Tendon Reflexes: Reflexes normal.   Psychiatric:         Thought Content: Thought content normal.         Judgment: Judgment normal.         Assessment:       1. Annual physical exam    2. Urinary retention    3. Palpitation    4. Atypical chest pain    5. Chronic tension-type headache, not intractable  "   6. Anxiety and depression    7. Cervicalgia    8. Chronic musculoskeletal pain    9. Osteopenia of multiple sites    10. Postmenopausal    11. Vaping nicotine dependence, non-tobacco product    12. BMI 27.0-27.9,adult    13. Laboratory exam ordered as part of routine general medical examination    14. Need for hepatitis C screening test    15. Immunity status testing    16. Screening mammogram, encounter for    17. Screening for malignant neoplasm of colon    18. Women's annual routine gynecological examination        Plan:       1. Annual physical exam  Age appropriate prevention guidelines implemented, unless patient refused  Encourage Advanced directives  Labs ordered   Immunizations reviewed. Encourage yearly influenza vaccine.  Patient Counseling:  --Nutrition: Encourage healthy food choices, adequate water intake, moderate sodium/caffeine intake, diet low in saturated fat and cholesterol. Importance of caloric balance and sufficient intake of fresh fruits, vegetables, fiber, calcium, iron, and 1 mg of folate supplement per day (for females capable of pregnancy).  --Exercise: Stressed the importance of regular exercise.   --Substance Abuse: Abstinence from tobacco products. No more than 2 alcoholic drinks per day in men or 1 alcoholic drink per day in women. Avoid illicit drugs, driving or other dangerous activities under the influence. In the event of abuse, treatment offered.   --Sexuality: Safe sex practices to avoid sexually transmitted diseases; careful partner selection, use of condoms and contraceptive alternatives, avoidance of unintended pregnancy in fertile women of child-bearing age  --Injury prevention: encourage safety belts, safety helmets, smoke detector.  --Dental health: Discussed importance of regular tooth brushing X2 daily, flossing X1 daily, and routine dental visits.  --Encourage use of sun screen, wear sun protective clothing  --Encourage routine eye exams    2. Urinary retention  -      US Retroperitoneal Complete; Future; Expected date: 10/01/2020  -     Urinalysis  -     Urine culture  Cut out beer- may have irritants. At the very least consider changing to alternative beer .    3. Palpitation  -     Holter monitor - 24 hour; Future  -     IN OFFICE EKG 12-LEAD (to Yehuda):  I have independently reviewed and interpreted the EKG which shows a heart rate of 53 beats per minute, sinus bradycardia, no acute ischemic changes.    4. Atypical chest pain  -     Exercise Stress - EKG; Future  -     TSH; Future; Expected date: 10/01/2020    5. Chronic tension-type headache, not intractable  -     Ambulatory referral/consult to Neurology; Future; Expected date: 10/08/2020    6. Anxiety and depression  -     ALPRAZolam (XANAX) 0.5 MG tablet; Take 1 tablet (0.5 mg total) by mouth daily as needed for Anxiety.  Dispense: 30 tablet; Refill: 0    7. Cervicalgia  8. Chronic musculoskeletal pain  -     cyclobenzaprine (FLEXERIL) 10 MG tablet; Take 1 tablet (10 mg total) by mouth 3 (three) times daily as needed for Muscle spasms.  Dispense: 90 tablet; Refill: 5  Activity modification. Heat/ cold therapy; topical Biofreeze, Aspercreme, Lidocaine OTC patches, Diclofenac gel. Tylenol and/or NSAIDs as needed. Careful with GI and renal adverse events with NSAIDs.   Exercises for strengthening and increased range of motion; supervised vs home vs combination physical therapy. May consider imaging and/or specialist consultation if symptoms do not improve.    9. Osteopenia of multiple sites  Encouraged adequate calcium and vitamin-D    10. Postmenopausal  No problems reported.    11. Vaping nicotine dependence, non-tobacco product  Encouraged to consider quitting    12. BMI 27.0-27.9,adult  Encourage healthy lifestyle changes through diet and exercise    13. Laboratory exam ordered as part of routine general medical examination  -     Hemoglobin A1C; Future; Expected date: 10/01/2020  Labs ordered prior to visit  but yet to schedule    14. Need for hepatitis C screening test  -     Hepatitis C Antibody; Future; Expected date: 10/01/2020    15. Immunity status testing  -     COVID-19 (SARS CoV-2) IgG Antibody; Future; Expected date: 10/01/2020  Patient requesting testing.  Possible exposure.    16. Screening mammogram, encounter for  -     Mammo Digital Screening Bilat w/ Abe; Future    17. Screening for malignant neoplasm of colon  -     Case request GI: COLONOSCOPY  Discussed the importance of colon cancer screening.    18. Women's annual routine gynecological examination  -     Ambulatory referral/consult to Gynecology; Future; Expected date: 10/08/2020  For pap smear see gynecology or she may elect to return to get this done at a later date.    Disclaimer: This note has been generated using voice-recognition software. There may be typographical errors that have been missed during proof-reading

## 2020-10-02 LAB
BACTERIA UR CULT: NO GROWTH
HCV AB SERPL QL IA: NEGATIVE

## 2020-10-05 ENCOUNTER — TELEPHONE (OUTPATIENT)
Dept: PRIMARY CARE CLINIC | Facility: CLINIC | Age: 60
End: 2020-10-05

## 2020-10-05 ENCOUNTER — HOSPITAL ENCOUNTER (OUTPATIENT)
Dept: RADIOLOGY | Facility: HOSPITAL | Age: 60
Discharge: HOME | End: 2020-10-05
Attending: FAMILY MEDICINE
Payer: MEDICAID

## 2020-10-05 DIAGNOSIS — Z12.31 SCREENING MAMMOGRAM, ENCOUNTER FOR: ICD-10-CM

## 2020-10-05 PROCEDURE — 77063 MAMMO DIGITAL SCREENING BILAT WITH TOMO: ICD-10-PCS | Mod: 26,,, | Performed by: RADIOLOGY

## 2020-10-05 PROCEDURE — 77067 MAMMO DIGITAL SCREENING BILAT WITH TOMO: ICD-10-PCS | Mod: 26,,, | Performed by: RADIOLOGY

## 2020-10-05 PROCEDURE — 77067 SCR MAMMO BI INCL CAD: CPT | Mod: 26,,, | Performed by: RADIOLOGY

## 2020-10-05 PROCEDURE — 77067 SCR MAMMO BI INCL CAD: CPT | Mod: TC,PN

## 2020-10-05 PROCEDURE — 77063 BREAST TOMOSYNTHESIS BI: CPT | Mod: 26,,, | Performed by: RADIOLOGY

## 2020-10-05 NOTE — TELEPHONE ENCOUNTER
----- Message from Keke Mcknight MD sent at 10/5/2020 10:55 AM CDT -----  No urinary tract infection.  Please let patient know results sent to portal.

## 2020-10-05 NOTE — TELEPHONE ENCOUNTER
----- Message from Keke Mcknight MD sent at 10/5/2020 11:42 AM CDT -----  Please let patient know message sent to portal.  Labs ordered for repeat 1 week prior to next annual physical exam.      Hello,    No hepatitis C    No COVID-19 antibodies detected    Normal thyroid function    Normal liver and kidney function testing    Elevated total cholesterol and the bad cholesterol, LDL is also elevated.  Your calculated risk of heart disease and stroke is low.  Will continue to monitor.  Low-cholesterol food choices and exercise encouraged.    No diabetes    Cell count stable.  No anemia.

## 2020-10-09 ENCOUNTER — PATIENT MESSAGE (OUTPATIENT)
Dept: ADMINISTRATIVE | Facility: HOSPITAL | Age: 60
End: 2020-10-09

## 2020-10-13 ENCOUNTER — TELEPHONE (OUTPATIENT)
Dept: PRIMARY CARE CLINIC | Facility: CLINIC | Age: 60
End: 2020-10-13

## 2020-10-13 ENCOUNTER — CLINICAL SUPPORT (OUTPATIENT)
Dept: CARDIOLOGY | Facility: CLINIC | Age: 60
End: 2020-10-13
Attending: FAMILY MEDICINE
Payer: MEDICAID

## 2020-10-13 VITALS — HEIGHT: 67 IN | WEIGHT: 178 LBS | BODY MASS INDEX: 27.94 KG/M2

## 2020-10-13 DIAGNOSIS — R07.89 ATYPICAL CHEST PAIN: ICD-10-CM

## 2020-10-13 DIAGNOSIS — R00.2 PALPITATION: ICD-10-CM

## 2020-10-13 LAB
CV STRESS BASE HR: 68 BPM
DIASTOLIC BLOOD PRESSURE: 102 MMHG
OHS CV CPX 1 MINUTE RECOVERY HEART RATE: 113 BPM
OHS CV CPX 85 PERCENT MAX PREDICTED HEART RATE MALE: 132
OHS CV CPX ESTIMATED METS: 8
OHS CV CPX MAX PREDICTED HEART RATE: 155
OHS CV CPX PATIENT IS FEMALE: 1
OHS CV CPX PATIENT IS MALE: 0
OHS CV CPX PEAK DIASTOLIC BLOOD PRESSURE: 85 MMHG
OHS CV CPX PEAK HEAR RATE: 133 BPM
OHS CV CPX PEAK RATE PRESSURE PRODUCT: NORMAL
OHS CV CPX PEAK SYSTOLIC BLOOD PRESSURE: 178 MMHG
OHS CV CPX PERCENT MAX PREDICTED HEART RATE ACHIEVED: 86
OHS CV CPX RATE PRESSURE PRODUCT PRESENTING: 8772
STRESS ECHO POST EXERCISE DUR MIN: 5 MINUTES
STRESS ECHO POST EXERCISE DUR SEC: 6 SECONDS
STRESS ST DEPRESSION: 0.3 MM
SYSTOLIC BLOOD PRESSURE: 129 MMHG

## 2020-10-13 PROCEDURE — 93018 EXERCISE STRESS - EKG (CUPID ONLY): ICD-10-PCS | Mod: S$PBB,,, | Performed by: INTERNAL MEDICINE

## 2020-10-13 PROCEDURE — 99999 PR PBB SHADOW E&M-EST. PATIENT-LVL II: ICD-10-PCS | Mod: PBBFAC,,,

## 2020-10-13 PROCEDURE — 93016 EXERCISE STRESS - EKG (CUPID ONLY): ICD-10-PCS | Mod: S$PBB,,, | Performed by: INTERNAL MEDICINE

## 2020-10-13 PROCEDURE — 99999 PR PBB SHADOW E&M-EST. PATIENT-LVL I: ICD-10-PCS | Mod: PBBFAC,,,

## 2020-10-13 PROCEDURE — 99211 OFF/OP EST MAY X REQ PHY/QHP: CPT | Mod: PBBFAC,25,27,PO

## 2020-10-13 PROCEDURE — 93018 CV STRESS TEST I&R ONLY: CPT | Mod: S$PBB,,, | Performed by: INTERNAL MEDICINE

## 2020-10-13 PROCEDURE — 99212 OFFICE O/P EST SF 10 MIN: CPT | Mod: PBBFAC,PO,25

## 2020-10-13 PROCEDURE — 93226 XTRNL ECG REC<48 HR SCAN A/R: CPT | Mod: PBBFAC,PO | Performed by: INTERNAL MEDICINE

## 2020-10-13 PROCEDURE — 93227 XTRNL ECG REC<48 HR R&I: CPT | Mod: S$PBB,,, | Performed by: INTERNAL MEDICINE

## 2020-10-13 PROCEDURE — 99999 PR PBB SHADOW E&M-EST. PATIENT-LVL II: CPT | Mod: PBBFAC,,,

## 2020-10-13 PROCEDURE — 99999 PR PBB SHADOW E&M-EST. PATIENT-LVL I: CPT | Mod: PBBFAC,,,

## 2020-10-13 PROCEDURE — 93016 CV STRESS TEST SUPVJ ONLY: CPT | Mod: S$PBB,,, | Performed by: INTERNAL MEDICINE

## 2020-10-13 PROCEDURE — 93017 CV STRESS TEST TRACING ONLY: CPT | Mod: PBBFAC,PO | Performed by: INTERNAL MEDICINE

## 2020-10-13 PROCEDURE — 93227 HOLTER MONITOR - 24 HOUR (CUPID ONLY): ICD-10-PCS | Mod: S$PBB,,, | Performed by: INTERNAL MEDICINE

## 2020-10-13 NOTE — TELEPHONE ENCOUNTER
----- Message from Keke Mcknight MD sent at 10/13/2020 11:06 AM CDT -----  Your stress EKG does not show any ischemia/abnormalities to the blood flow in the heart.  You experienced shortness of breath with increased intensity in activity; this is due to physical deconditioning.  Encourage exercise.    Please let patient know message sent to portal

## 2020-10-15 LAB
OHS CV EVENT MONITOR DAY: 0
OHS CV HOLTER LENGTH DECIMAL HOURS: 24
OHS CV HOLTER LENGTH HOURS: 24
OHS CV HOLTER LENGTH MINUTES: 0

## 2020-10-16 ENCOUNTER — HOSPITAL ENCOUNTER (OUTPATIENT)
Dept: RADIOLOGY | Facility: HOSPITAL | Age: 60
Discharge: HOME | End: 2020-10-16
Attending: FAMILY MEDICINE
Payer: MEDICAID

## 2020-10-16 ENCOUNTER — TELEPHONE (OUTPATIENT)
Dept: PRIMARY CARE CLINIC | Facility: CLINIC | Age: 60
End: 2020-10-16

## 2020-10-16 DIAGNOSIS — R33.9 URINARY RETENTION: ICD-10-CM

## 2020-10-16 DIAGNOSIS — K76.9 LIVER LESION, RIGHT LOBE: Primary | ICD-10-CM

## 2020-10-16 PROCEDURE — 76770 US EXAM ABDO BACK WALL COMP: CPT | Mod: TC

## 2020-10-16 PROCEDURE — 76770 US RETROPERITONEAL COMPLETE: ICD-10-PCS | Mod: 26,,, | Performed by: RADIOLOGY

## 2020-10-16 PROCEDURE — 76770 US EXAM ABDO BACK WALL COMP: CPT | Mod: 26,,, | Performed by: RADIOLOGY

## 2020-10-16 NOTE — TELEPHONE ENCOUNTER
----- Message from Keke Mcknight MD sent at 10/16/2020  4:37 PM CDT -----  Hello,    Kidney showed no dilation, stones or masses.  The bladder overall looks good.  Incidentally there was of liver lesion discovered.  This is most likely hemangioma which is benign- will repeat ultrasound in 6 months.    Please let patient know message sent to portal.  Repeat ultrasound in 6 months to keep an eye on the hemangioma.

## 2020-10-16 NOTE — TELEPHONE ENCOUNTER
----- Message from Keke Mcknight MD sent at 10/15/2020  9:37 PM CDT -----  No abnormal heart rhythm. Please let patient know result sent to portal.

## 2020-10-20 ENCOUNTER — OFFICE VISIT (OUTPATIENT)
Dept: OBSTETRICS AND GYNECOLOGY | Facility: CLINIC | Age: 60
End: 2020-10-20
Payer: MEDICAID

## 2020-10-20 VITALS
DIASTOLIC BLOOD PRESSURE: 72 MMHG | BODY MASS INDEX: 27.16 KG/M2 | WEIGHT: 173.06 LBS | HEIGHT: 67 IN | SYSTOLIC BLOOD PRESSURE: 120 MMHG

## 2020-10-20 DIAGNOSIS — M85.80 OSTEOPENIA DETERMINED BY X-RAY: ICD-10-CM

## 2020-10-20 DIAGNOSIS — Z01.419 WOMEN'S ANNUAL ROUTINE GYNECOLOGICAL EXAMINATION: ICD-10-CM

## 2020-10-20 DIAGNOSIS — N95.2 VAGINAL ATROPHY: ICD-10-CM

## 2020-10-20 DIAGNOSIS — Z01.419 WELL WOMAN EXAM WITH ROUTINE GYNECOLOGICAL EXAM: Primary | ICD-10-CM

## 2020-10-20 PROCEDURE — 99386 PR PREVENTIVE VISIT,NEW,40-64: ICD-10-PCS | Mod: S$PBB,,, | Performed by: STUDENT IN AN ORGANIZED HEALTH CARE EDUCATION/TRAINING PROGRAM

## 2020-10-20 PROCEDURE — 99386 PREV VISIT NEW AGE 40-64: CPT | Mod: S$PBB,,, | Performed by: STUDENT IN AN ORGANIZED HEALTH CARE EDUCATION/TRAINING PROGRAM

## 2020-10-20 PROCEDURE — 99999 PR PBB SHADOW E&M-EST. PATIENT-LVL IV: ICD-10-PCS | Mod: PBBFAC,,, | Performed by: STUDENT IN AN ORGANIZED HEALTH CARE EDUCATION/TRAINING PROGRAM

## 2020-10-20 PROCEDURE — 99214 OFFICE O/P EST MOD 30 MIN: CPT | Mod: PBBFAC,PN | Performed by: STUDENT IN AN ORGANIZED HEALTH CARE EDUCATION/TRAINING PROGRAM

## 2020-10-20 PROCEDURE — 88141 CYTOPATH C/V INTERPRET: CPT | Mod: ,,, | Performed by: PATHOLOGY

## 2020-10-20 PROCEDURE — 88141 PR  CYTOPATH CERV/VAG INTERPRET: ICD-10-PCS | Mod: ,,, | Performed by: PATHOLOGY

## 2020-10-20 PROCEDURE — 99999 PR PBB SHADOW E&M-EST. PATIENT-LVL IV: CPT | Mod: PBBFAC,,, | Performed by: STUDENT IN AN ORGANIZED HEALTH CARE EDUCATION/TRAINING PROGRAM

## 2020-10-20 PROCEDURE — 88142 CYTOPATH C/V THIN LAYER: CPT | Performed by: PATHOLOGY

## 2020-10-20 PROCEDURE — 87624 HPV HI-RISK TYP POOLED RSLT: CPT

## 2020-10-20 RX ORDER — ESTRADIOL 0.1 MG/G
1 CREAM VAGINAL DAILY
Qty: 42.5 G | Refills: 11 | Status: SHIPPED | OUTPATIENT
Start: 2020-10-20 | End: 2022-10-18 | Stop reason: SDUPTHER

## 2020-10-20 NOTE — PROGRESS NOTES
History & Physical  Gynecology      SUBJECTIVE:     Chief Complaint: Well Woman       History of Present Illness:  Annual exam. +vaginal dryness, trouble initiating flow of urine. Uculture by PCP last week neg.    Menstrual History: surgical menopause since 45, no HRT. No PMB  Sexually Active: no  GYN surgery: LSO, RS  Family history: Denies any personal or family history of GYN/colon cancers   Social: Wears seatbelts. Exercises. Feels safe at home.   Last pap: unsure  Last mammo: 10/2020  Colonoscopy: scheduled  DEXA: 2013  Flu: utd  Labs utd  Vitamins: taking Vit D    Review of patient's allergies indicates:   Allergen Reactions    Penicillins Itching and Nausea Only    Cephalexin Hives, Itching, Rash and Swelling    Meperidine Itching and Rash       History reviewed. No pertinent past medical history.  Past Surgical History:   Procedure Laterality Date    OOPHORECTOMY       OB History    No obstetric history on file.       Family History   Problem Relation Age of Onset    Coronary artery disease Father     Hypertension Father     No Known Problems Sister     No Known Problems Brother     No Known Problems Brother     No Known Problems Brother     No Known Problems Brother     No Known Problems Sister      Social History     Tobacco Use    Smoking status: Current Every Day Smoker     Types: Vaping with nicotine    Smokeless tobacco: Never Used   Substance Use Topics    Alcohol use: Yes     Frequency: 2-3 times a week     Drinks per session: 1 or 2     Binge frequency: Never    Drug use: Not Currently     Types: Cocaine, Marijuana, Methamphetamines, Barbituates, Hashish, LSD, MDMA (Ecstacy), PCP, Psilocybin     Comment: last used > 10 years ago       Current Outpatient Medications   Medication Sig    ALPRAZolam (XANAX) 0.5 MG tablet Take 1 tablet (0.5 mg total) by mouth daily as needed for Anxiety.    citalopram (CELEXA) 20 MG tablet Take 1 tablet (20 mg total) by mouth once daily.     cyclobenzaprine (FLEXERIL) 10 MG tablet Take 1 tablet (10 mg total) by mouth 3 (three) times daily as needed for Muscle spasms.    fluticasone propionate (FLONASE) 50 mcg/actuation nasal spray instill 1 spray (50 mcg total) by Each Nostril route once daily.    azithromycin (Z-DULCE) 250 MG tablet Take 2 tablets on day 1 then 1 tablet on days 2-5 (Patient not taking: Reported on 10/20/2020)    ibuprofen (ADVIL,MOTRIN) 800 mg/8 mL (100 mg/mL) SolR     naproxen (NAPROSYN) 500 MG tablet TK ONE T PO Q 12 HOURS WITH FOOD PRN     No current facility-administered medications for this visit.          Review of Systems:  Review of Systems   Constitutional: Negative for activity change, appetite change, fever and unexpected weight change.   Respiratory: Negative for shortness of breath.    Cardiovascular: Negative for chest pain.   Gastrointestinal: Negative for abdominal pain, blood in stool, constipation, diarrhea, nausea and vomiting.   Endocrine: Negative for hot flashes.   Genitourinary: Positive for dyspareunia and vaginal dryness. Negative for dysuria, frequency, hematuria, hot flashes, pelvic pain, urgency, vaginal bleeding, vaginal discharge, vaginal pain, urinary incontinence, postcoital bleeding and postmenopausal bleeding.   Integumentary:  Negative for breast mass.   Psychiatric/Behavioral: Negative for sleep disturbance.   Breast: Negative for lump and mass       OBJECTIVE:     Physical Exam:  Physical Exam  Vitals signs reviewed.   Constitutional:       General: She is not in acute distress.     Appearance: She is well-developed. She is not diaphoretic.   HENT:      Head: Normocephalic and atraumatic.   Eyes:      Conjunctiva/sclera: Conjunctivae normal.   Neck:      Musculoskeletal: Normal range of motion.   Cardiovascular:      Rate and Rhythm: Normal rate.   Pulmonary:      Effort: Pulmonary effort is normal.   Abdominal:      General: There is no distension.      Palpations: Abdomen is soft. There is no  mass.      Tenderness: There is no abdominal tenderness. There is no guarding or rebound.   Genitourinary:     Labia:         Right: No rash, tenderness, lesion or injury.         Left: No rash, tenderness, lesion or injury.       Vagina: No signs of injury and foreign body. No vaginal discharge, erythema, tenderness or bleeding.      Cervix: No cervical motion tenderness, discharge or friability.      Uterus: Not deviated, not enlarged, not fixed and not tender.       Adnexa:         Right: No mass, tenderness or fullness.          Left: No mass, tenderness or fullness.        Comments: Atrophy apparent. Resorption of labia minor bilaterally. Difficult to get to cervix due to discomfort  Musculoskeletal: Normal range of motion.   Skin:     General: Skin is warm and dry.   Neurological:      Mental Status: She is alert.           ASSESSMENT:       ICD-10-CM ICD-9-CM    1. Well woman exam with routine gynecological exam  Z01.419 V72.31    2. Women's annual routine gynecological examination  Z01.419 V72.31 Ambulatory referral/consult to Gynecology   3. Osteopenia determined by x-ray  M85.80 733.90           Plan:      WWE  - Postmenopausal. No issues.  - Vaccines utd  - Labs utd  - DEXA ordered to follow up osteopenia.  - Mammogram, Colonoscopy utd  - Pap, Co test today  - Vitamin D and Calcium recs given  - CBE normal. Physical exam normal. VSS    Vaginal atrophy  - comfort measures reviewed  - estrace cream sent      Counseling time: 30 minutes    Cinthya Hernandez

## 2020-10-20 NOTE — LETTER
October 20, 2020      Keke Mcknight MD  [address] [facility] [facility] [address]           [facility] - OB GYN  [address]  [facility] [address]  Phone: [phone]  Fax: [phone]          Patient: Linda Kahn   MR Number: [id number]   YOB: 1960   Date of Visit: 10/20/2020       Dear Dr. Keke Mcknight:    Thank you for referring Linda Kahn to me for evaluation. Attached you will find relevant portions of my assessment and plan of care.    If you have questions, please do not hesitate to call me. I look forward to following Linda Kahn along with you.    Sincerely,    Cinthya Hernandez MD    Enclosure  CC:  No Recipients    If you would like to receive this communication electronically, please contact [email or web address] or [phone] to request more information on [facility] access.    For providers and/or their staff who would like to refer a patient to [facility], please contact us through our one-stop-shop provider referral line, [facility], at [phone].    If you feel you have received this communication in error or would no longer like to receive these types of communications, please e-mail [email or web address]

## 2020-10-26 ENCOUNTER — APPOINTMENT (OUTPATIENT)
Dept: RADIOLOGY | Facility: CLINIC | Age: 60
End: 2020-10-26
Attending: STUDENT IN AN ORGANIZED HEALTH CARE EDUCATION/TRAINING PROGRAM
Payer: MEDICAID

## 2020-10-26 DIAGNOSIS — Z01.419 WELL WOMAN EXAM WITH ROUTINE GYNECOLOGICAL EXAM: ICD-10-CM

## 2020-10-26 DIAGNOSIS — M85.80 OSTEOPENIA DETERMINED BY X-RAY: ICD-10-CM

## 2020-10-26 PROCEDURE — 77080 DXA BONE DENSITY AXIAL: CPT | Mod: TC,PO

## 2020-10-26 PROCEDURE — 77080 DXA BONE DENSITY AXIAL: CPT | Mod: 26,,, | Performed by: INTERNAL MEDICINE

## 2020-10-26 PROCEDURE — 77080 DEXA BONE DENSITY SPINE HIP: ICD-10-PCS | Mod: 26,,, | Performed by: INTERNAL MEDICINE

## 2020-10-29 ENCOUNTER — HOSPITAL ENCOUNTER (OUTPATIENT)
Dept: RADIOLOGY | Facility: HOSPITAL | Age: 60
Discharge: HOME | End: 2020-10-29
Attending: FAMILY MEDICINE
Payer: MEDICAID

## 2020-10-29 DIAGNOSIS — K76.9 LIVER LESION, RIGHT LOBE: ICD-10-CM

## 2020-10-29 PROCEDURE — 76705 US ABDOMEN LIMITED: ICD-10-PCS | Mod: 26,,, | Performed by: RADIOLOGY

## 2020-10-29 PROCEDURE — 76705 ECHO EXAM OF ABDOMEN: CPT | Mod: 26,,, | Performed by: RADIOLOGY

## 2020-10-29 PROCEDURE — 76705 ECHO EXAM OF ABDOMEN: CPT | Mod: TC

## 2020-10-30 ENCOUNTER — TELEPHONE (OUTPATIENT)
Dept: PRIMARY CARE CLINIC | Facility: CLINIC | Age: 60
End: 2020-10-30

## 2020-10-30 ENCOUNTER — PATIENT MESSAGE (OUTPATIENT)
Dept: ADMINISTRATIVE | Facility: HOSPITAL | Age: 60
End: 2020-10-30

## 2020-10-30 NOTE — TELEPHONE ENCOUNTER
----- Message from Keke Mcknight MD sent at 10/29/2020  9:11 PM CDT -----  Please let patient know that message was sent to portal:    You repeat the ultrasound very early. No change.     Reference:-   Keke Mcknight MD   10/16/2020  4:37 PM    Hello,     Kidney showed no dilation, stones or masses.  The bladder overall looks good.  Incidentally there was of liver lesion discovered.  This is most likely hemangioma which is benign- will repeat ultrasound in 6 months.     Please let patient know message sent to portal.  Repeat ultrasound in 6 months to keep an eye on the hemangioma.

## 2020-11-02 LAB
HPV HR 12 DNA SPEC QL NAA+PROBE: NEGATIVE
HPV16 AG SPEC QL: NEGATIVE
HPV18 DNA SPEC QL NAA+PROBE: NEGATIVE

## 2020-11-04 DIAGNOSIS — M81.0 OSTEOPOROSIS, UNSPECIFIED OSTEOPOROSIS TYPE, UNSPECIFIED PATHOLOGICAL FRACTURE PRESENCE: Primary | ICD-10-CM

## 2020-11-20 ENCOUNTER — TELEPHONE (OUTPATIENT)
Dept: OBSTETRICS AND GYNECOLOGY | Facility: CLINIC | Age: 60
End: 2020-11-20

## 2020-11-20 NOTE — TELEPHONE ENCOUNTER
----- Message from Cinthya Hernandez MD sent at 11/20/2020  2:16 PM CST -----  Please let patient know we did not get enough cells on her pap smear. She will need to get another pap in 4 weeks or so

## 2020-12-04 DIAGNOSIS — Z12.11 COLON CANCER SCREENING: ICD-10-CM

## 2021-01-04 ENCOUNTER — PATIENT MESSAGE (OUTPATIENT)
Dept: ADMINISTRATIVE | Facility: HOSPITAL | Age: 61
End: 2021-01-04

## 2021-02-04 ENCOUNTER — TELEPHONE (OUTPATIENT)
Dept: PRIMARY CARE CLINIC | Facility: CLINIC | Age: 61
End: 2021-02-04

## 2021-02-11 ENCOUNTER — LAB VISIT (OUTPATIENT)
Dept: LAB | Facility: HOSPITAL | Age: 61
End: 2021-02-11
Attending: FAMILY MEDICINE
Payer: MEDICAID

## 2021-02-11 DIAGNOSIS — Z12.11 COLON CANCER SCREENING: ICD-10-CM

## 2021-02-11 PROCEDURE — 82274 ASSAY TEST FOR BLOOD FECAL: CPT

## 2021-02-19 ENCOUNTER — TELEPHONE (OUTPATIENT)
Dept: PRIMARY CARE CLINIC | Facility: CLINIC | Age: 61
End: 2021-02-19

## 2021-02-19 LAB — HEMOCCULT STL QL IA: NEGATIVE

## 2021-02-24 ENCOUNTER — TELEPHONE (OUTPATIENT)
Dept: PRIMARY CARE CLINIC | Facility: CLINIC | Age: 61
End: 2021-02-24

## 2021-03-05 ENCOUNTER — IMMUNIZATION (OUTPATIENT)
Dept: PRIMARY CARE CLINIC | Facility: CLINIC | Age: 61
End: 2021-03-05
Payer: MEDICAID

## 2021-03-05 DIAGNOSIS — Z23 NEED FOR VACCINATION: Primary | ICD-10-CM

## 2021-03-05 PROCEDURE — 91303 PR SARSCOV2 VAC AD26 .5ML IM: CPT | Mod: S$GLB,,, | Performed by: INTERNAL MEDICINE

## 2021-03-05 PROCEDURE — 91303 PR SARSCOV2 VAC AD26 .5ML IM: ICD-10-PCS | Mod: S$GLB,,, | Performed by: INTERNAL MEDICINE

## 2021-03-05 PROCEDURE — 0031A PR IMMUNIZ ADMIN, SARS-COV-2 COVID-19 VACC, 5X10VP/0.5ML: ICD-10-PCS | Mod: CV19,S$GLB,, | Performed by: INTERNAL MEDICINE

## 2021-03-05 PROCEDURE — 0031A PR IMMUNIZ ADMIN, SARS-COV-2 COVID-19 VACC, 5X10VP/0.5ML: CPT | Mod: CV19,S$GLB,, | Performed by: INTERNAL MEDICINE

## 2021-04-13 DIAGNOSIS — F32.A ANXIETY AND DEPRESSION: ICD-10-CM

## 2021-04-13 DIAGNOSIS — F41.9 ANXIETY AND DEPRESSION: ICD-10-CM

## 2021-04-13 RX ORDER — ALPRAZOLAM 0.5 MG/1
0.5 TABLET ORAL DAILY PRN
Qty: 30 TABLET | Refills: 0 | OUTPATIENT
Start: 2021-04-13

## 2021-05-24 ENCOUNTER — TELEPHONE (OUTPATIENT)
Dept: PRIMARY CARE CLINIC | Facility: CLINIC | Age: 61
End: 2021-05-24

## 2021-05-27 ENCOUNTER — TELEPHONE (OUTPATIENT)
Dept: PRIMARY CARE CLINIC | Facility: CLINIC | Age: 61
End: 2021-05-27

## 2021-06-03 ENCOUNTER — OFFICE VISIT (OUTPATIENT)
Dept: PRIMARY CARE CLINIC | Facility: CLINIC | Age: 61
End: 2021-06-03
Payer: MEDICAID

## 2021-06-03 ENCOUNTER — LAB VISIT (OUTPATIENT)
Dept: LAB | Facility: HOSPITAL | Age: 61
End: 2021-06-03
Attending: FAMILY MEDICINE
Payer: MEDICAID

## 2021-06-03 VITALS
WEIGHT: 177.5 LBS | OXYGEN SATURATION: 98 % | HEART RATE: 71 BPM | SYSTOLIC BLOOD PRESSURE: 126 MMHG | HEIGHT: 67 IN | DIASTOLIC BLOOD PRESSURE: 70 MMHG | BODY MASS INDEX: 27.86 KG/M2

## 2021-06-03 DIAGNOSIS — D18.03 HEPATIC HEMANGIOMA: ICD-10-CM

## 2021-06-03 DIAGNOSIS — R10.2 PELVIC PAIN: ICD-10-CM

## 2021-06-03 DIAGNOSIS — F17.200 VAPING NICOTINE DEPENDENCE, NON-TOBACCO PRODUCT: ICD-10-CM

## 2021-06-03 DIAGNOSIS — Z78.0 POSTMENOPAUSAL: ICD-10-CM

## 2021-06-03 DIAGNOSIS — E04.9 THYROID ENLARGEMENT: ICD-10-CM

## 2021-06-03 DIAGNOSIS — E04.9 THYROID ENLARGEMENT: Primary | ICD-10-CM

## 2021-06-03 DIAGNOSIS — M50.30 DEGENERATIVE CERVICAL DISC: ICD-10-CM

## 2021-06-03 DIAGNOSIS — F32.A ANXIETY AND DEPRESSION: ICD-10-CM

## 2021-06-03 DIAGNOSIS — M81.6 LOCALIZED OSTEOPOROSIS WITHOUT CURRENT PATHOLOGICAL FRACTURE: ICD-10-CM

## 2021-06-03 DIAGNOSIS — R33.9 URINARY RETENTION: ICD-10-CM

## 2021-06-03 DIAGNOSIS — F41.9 ANXIETY AND DEPRESSION: ICD-10-CM

## 2021-06-03 PROBLEM — M85.89 OSTEOPENIA OF MULTIPLE SITES: Status: RESOLVED | Noted: 2019-09-30 | Resolved: 2021-06-03

## 2021-06-03 PROCEDURE — 84439 ASSAY OF FREE THYROXINE: CPT | Performed by: FAMILY MEDICINE

## 2021-06-03 PROCEDURE — 99999 PR PBB SHADOW E&M-EST. PATIENT-LVL V: CPT | Mod: PBBFAC,,, | Performed by: FAMILY MEDICINE

## 2021-06-03 PROCEDURE — 99214 PR OFFICE/OUTPT VISIT, EST, LEVL IV, 30-39 MIN: ICD-10-PCS | Mod: S$PBB,,, | Performed by: FAMILY MEDICINE

## 2021-06-03 PROCEDURE — 99999 PR PBB SHADOW E&M-EST. PATIENT-LVL V: ICD-10-PCS | Mod: PBBFAC,,, | Performed by: FAMILY MEDICINE

## 2021-06-03 PROCEDURE — 84480 ASSAY TRIIODOTHYRONINE (T3): CPT | Performed by: FAMILY MEDICINE

## 2021-06-03 PROCEDURE — 99214 OFFICE O/P EST MOD 30 MIN: CPT | Mod: S$PBB,,, | Performed by: FAMILY MEDICINE

## 2021-06-03 PROCEDURE — 36415 COLL VENOUS BLD VENIPUNCTURE: CPT | Mod: PN | Performed by: FAMILY MEDICINE

## 2021-06-03 PROCEDURE — 84443 ASSAY THYROID STIM HORMONE: CPT | Performed by: FAMILY MEDICINE

## 2021-06-03 PROCEDURE — 99215 OFFICE O/P EST HI 40 MIN: CPT | Mod: PBBFAC,PN | Performed by: FAMILY MEDICINE

## 2021-06-03 RX ORDER — ALPRAZOLAM 0.5 MG/1
0.5 TABLET ORAL DAILY PRN
Qty: 30 TABLET | Refills: 1 | Status: SHIPPED | OUTPATIENT
Start: 2021-06-03 | End: 2022-02-08 | Stop reason: SDUPTHER

## 2021-06-04 LAB
T3 SERPL-MCNC: 65 NG/DL (ref 60–180)
T4 FREE SERPL-MCNC: 0.94 NG/DL (ref 0.71–1.51)
TSH SERPL DL<=0.005 MIU/L-ACNC: 0.57 UIU/ML (ref 0.4–4)

## 2021-06-08 ENCOUNTER — TELEPHONE (OUTPATIENT)
Dept: PRIMARY CARE CLINIC | Facility: CLINIC | Age: 61
End: 2021-06-08

## 2021-06-08 ENCOUNTER — PATIENT MESSAGE (OUTPATIENT)
Dept: PRIMARY CARE CLINIC | Facility: CLINIC | Age: 61
End: 2021-06-08

## 2021-06-10 ENCOUNTER — TELEPHONE (OUTPATIENT)
Dept: PRIMARY CARE CLINIC | Facility: CLINIC | Age: 61
End: 2021-06-10

## 2021-06-16 ENCOUNTER — TELEPHONE (OUTPATIENT)
Dept: PRIMARY CARE CLINIC | Facility: CLINIC | Age: 61
End: 2021-06-16

## 2021-06-17 ENCOUNTER — PATIENT MESSAGE (OUTPATIENT)
Dept: PRIMARY CARE CLINIC | Facility: CLINIC | Age: 61
End: 2021-06-17

## 2021-06-19 ENCOUNTER — HOSPITAL ENCOUNTER (OUTPATIENT)
Dept: RADIOLOGY | Facility: OTHER | Age: 61
Discharge: HOME | End: 2021-06-19
Attending: FAMILY MEDICINE
Payer: MEDICAID

## 2021-06-19 DIAGNOSIS — E04.9 THYROID ENLARGEMENT: ICD-10-CM

## 2021-06-19 PROCEDURE — 76536 US EXAM OF HEAD AND NECK: CPT | Mod: 26,,, | Performed by: RADIOLOGY

## 2021-06-19 PROCEDURE — 76536 US EXAM OF HEAD AND NECK: CPT | Mod: TC

## 2021-06-19 PROCEDURE — 76536 US SOFT TISSUE HEAD NECK THYROID: ICD-10-PCS | Mod: 26,,, | Performed by: RADIOLOGY

## 2021-06-20 ENCOUNTER — PATIENT MESSAGE (OUTPATIENT)
Dept: PRIMARY CARE CLINIC | Facility: CLINIC | Age: 61
End: 2021-06-20

## 2021-06-20 DIAGNOSIS — D18.03 LIVER HEMANGIOMA: Primary | ICD-10-CM

## 2021-06-21 ENCOUNTER — TELEPHONE (OUTPATIENT)
Dept: PRIMARY CARE CLINIC | Facility: CLINIC | Age: 61
End: 2021-06-21

## 2021-06-21 DIAGNOSIS — E04.1 NODULE OF LEFT LOBE OF THYROID GLAND: Primary | ICD-10-CM

## 2021-07-19 ENCOUNTER — PATIENT MESSAGE (OUTPATIENT)
Dept: PRIMARY CARE CLINIC | Facility: CLINIC | Age: 61
End: 2021-07-19

## 2021-07-19 ENCOUNTER — TELEPHONE (OUTPATIENT)
Dept: INFUSION THERAPY | Facility: OTHER | Age: 61
End: 2021-07-19

## 2021-07-19 DIAGNOSIS — F41.9 ANXIETY AND DEPRESSION: ICD-10-CM

## 2021-07-19 DIAGNOSIS — F32.A ANXIETY AND DEPRESSION: ICD-10-CM

## 2021-07-19 DIAGNOSIS — F43.29 ADJUSTMENT DISORDER WITH MIXED EMOTIONAL FEATURES: ICD-10-CM

## 2021-07-19 RX ORDER — CITALOPRAM 20 MG/1
20 TABLET, FILM COATED ORAL DAILY
Qty: 90 TABLET | Refills: 3 | Status: SHIPPED | OUTPATIENT
Start: 2021-07-19 | End: 2022-09-02 | Stop reason: SDUPTHER

## 2021-08-17 ENCOUNTER — PATIENT MESSAGE (OUTPATIENT)
Dept: PRIMARY CARE CLINIC | Facility: CLINIC | Age: 61
End: 2021-08-17

## 2021-10-15 ENCOUNTER — TELEPHONE (OUTPATIENT)
Dept: PRIMARY CARE CLINIC | Facility: CLINIC | Age: 61
End: 2021-10-15

## 2021-10-15 ENCOUNTER — HOSPITAL ENCOUNTER (OUTPATIENT)
Dept: RADIOLOGY | Facility: OTHER | Age: 61
Discharge: HOME | End: 2021-10-15
Attending: FAMILY MEDICINE
Payer: MEDICAID

## 2021-10-15 DIAGNOSIS — D18.03 LIVER HEMANGIOMA: ICD-10-CM

## 2021-10-15 PROBLEM — K76.89 HEPATIC CYST: Status: ACTIVE | Noted: 2021-10-15

## 2021-10-15 PROCEDURE — 76700 US EXAM ABDOM COMPLETE: CPT | Mod: 26,,, | Performed by: RADIOLOGY

## 2021-10-15 PROCEDURE — 76700 US ABDOMEN COMPLETE: ICD-10-PCS | Mod: 26,,, | Performed by: RADIOLOGY

## 2021-10-15 PROCEDURE — 76700 US EXAM ABDOM COMPLETE: CPT | Mod: TC

## 2021-12-03 ENCOUNTER — TELEPHONE (OUTPATIENT)
Dept: PRIMARY CARE CLINIC | Facility: CLINIC | Age: 61
End: 2021-12-03
Payer: MEDICAID

## 2021-12-03 DIAGNOSIS — Z12.31 SCREENING MAMMOGRAM, ENCOUNTER FOR: Primary | ICD-10-CM

## 2021-12-14 ENCOUNTER — HOSPITAL ENCOUNTER (OUTPATIENT)
Dept: RADIOLOGY | Facility: HOSPITAL | Age: 61
Discharge: HOME | End: 2021-12-14
Attending: FAMILY MEDICINE
Payer: MEDICAID

## 2021-12-14 VITALS — BODY MASS INDEX: 25.37 KG/M2 | WEIGHT: 162 LBS

## 2021-12-14 DIAGNOSIS — Z12.31 SCREENING MAMMOGRAM, ENCOUNTER FOR: ICD-10-CM

## 2021-12-14 PROCEDURE — 77067 MAMMO DIGITAL SCREENING BILAT WITH TOMO: ICD-10-PCS | Mod: 26,,, | Performed by: RADIOLOGY

## 2021-12-14 PROCEDURE — 77067 SCR MAMMO BI INCL CAD: CPT | Mod: TC,PN

## 2021-12-14 PROCEDURE — 77063 BREAST TOMOSYNTHESIS BI: CPT | Mod: 26,,, | Performed by: RADIOLOGY

## 2021-12-14 PROCEDURE — 77067 SCR MAMMO BI INCL CAD: CPT | Mod: 26,,, | Performed by: RADIOLOGY

## 2021-12-14 PROCEDURE — 77063 MAMMO DIGITAL SCREENING BILAT WITH TOMO: ICD-10-PCS | Mod: 26,,, | Performed by: RADIOLOGY

## 2021-12-29 ENCOUNTER — PATIENT MESSAGE (OUTPATIENT)
Dept: PRIMARY CARE CLINIC | Facility: CLINIC | Age: 61
End: 2021-12-29
Payer: MEDICAID

## 2021-12-29 DIAGNOSIS — D22.9 CHANGE IN SKIN MOLE: Primary | ICD-10-CM

## 2022-02-08 DIAGNOSIS — F32.A ANXIETY AND DEPRESSION: ICD-10-CM

## 2022-02-08 DIAGNOSIS — F41.9 ANXIETY AND DEPRESSION: ICD-10-CM

## 2022-02-08 RX ORDER — ALPRAZOLAM 0.5 MG/1
0.5 TABLET ORAL DAILY PRN
Qty: 20 TABLET | Refills: 0 | Status: SHIPPED | OUTPATIENT
Start: 2022-02-08 | End: 2023-06-12 | Stop reason: SDUPTHER

## 2022-05-12 ENCOUNTER — PATIENT MESSAGE (OUTPATIENT)
Dept: PRIMARY CARE CLINIC | Facility: CLINIC | Age: 62
End: 2022-05-12
Payer: MEDICAID

## 2022-06-28 ENCOUNTER — OFFICE VISIT (OUTPATIENT)
Dept: PRIMARY CARE CLINIC | Facility: CLINIC | Age: 62
End: 2022-06-28
Payer: MEDICAID

## 2022-06-28 DIAGNOSIS — R19.7 DIARRHEA, UNSPECIFIED TYPE: Primary | ICD-10-CM

## 2022-06-28 DIAGNOSIS — K52.9 GASTROENTERITIS: ICD-10-CM

## 2022-06-28 PROCEDURE — 99213 OFFICE O/P EST LOW 20 MIN: CPT | Mod: 95,,, | Performed by: FAMILY MEDICINE

## 2022-06-28 PROCEDURE — 99213 PR OFFICE/OUTPT VISIT, EST, LEVL III, 20-29 MIN: ICD-10-PCS | Mod: 95,,, | Performed by: FAMILY MEDICINE

## 2022-06-28 NOTE — PROGRESS NOTES
The patient location is: home  The chief complaint leading to consultation is: diarrhea    Visit type: audiovisual    Face to Face time with patient:  25 minutes of total time spent on the encounter, which includes face to face time and non-face to face time preparing to see the patient (eg, review of tests), Obtaining and/or reviewing separately obtained history, Documenting clinical information in the electronic or other health record, Independently interpreting results (not separately reported) and communicating results to the patient/family/caregiver, or Care coordination (not separately reported).         Each patient to whom he or she provides medical services by telemedicine is:  (1) informed of the relationship between the physician and patient and the respective role of any other health care provider with respect to management of the patient; and (2) notified that he or she may decline to receive medical services by telemedicine and may withdraw from such care at any time.    Notes:       Clinic Note  6/28/2022      Subjective:       Patient ID:  Linda is a 62 y.o. female being seen for an new visit.      Chief Complaint: diarrhea    Diarrhea - pt reports 3 weeks of diarrheal symptoms. Symptoms initially started on 6/90/2022 occurring after eating salad with chicken. Had associated myalgias, poor appetite, fever, chills. Since then, symptoms have improved but still having diarrhea. Then about 5 days ago, diarrheal symptoms have worsened again with about 3-4 BM per day. Denies recent travel, antibiotics, blood in stool. Pt able to tolerate liquids and some solids, minimal nausea/vomiting. Reports staying hydrated, no dizziness, dyspnea, dark urine. Has been taking beptobismol the last several days, now with black stools. Has had ice cream once, not really eating dairy products, stopped caffeine and alcohol.         Family History   Problem Relation Age of Onset    Coronary artery disease Father      Hypertension Father     No Known Problems Sister     No Known Problems Brother     No Known Problems Brother     No Known Problems Brother     No Known Problems Brother     No Known Problems Sister      Social History     Socioeconomic History    Marital status: Single   Tobacco Use    Smoking status: Current Every Day Smoker     Types: Vaping with nicotine    Smokeless tobacco: Never Used   Substance and Sexual Activity    Alcohol use: Yes    Drug use: Not Currently     Types: Cocaine, Marijuana, Methamphetamines, Barbituates, Hashish, LSD, MDMA (Ecstacy), PCP, Psilocybin     Comment: last used > 10 years ago    Sexual activity: Not Currently     Partners: Male     Past Surgical History:   Procedure Laterality Date    OOPHORECTOMY Left      Medication List with Changes/Refills   Current Medications    ALPRAZOLAM (XANAX) 0.5 MG TABLET    Take 1 tablet (0.5 mg total) by mouth daily as needed for Anxiety.    CITALOPRAM (CELEXA) 20 MG TABLET    Take 1 tablet (20 mg total) by mouth once daily.    CYCLOBENZAPRINE (FLEXERIL) 10 MG TABLET    Take 1 tablet (10 mg total) by mouth 3 (three) times daily as needed for Muscle spasms.    ESTRADIOL (ESTRACE) 0.01 % (0.1 MG/GRAM) VAGINAL CREAM    apply 1 gram Twice per day for 2 weeks, then apply 1 gram twice a week there after    FLUTICASONE PROPIONATE (FLONASE) 50 MCG/ACTUATION NASAL SPRAY    instill 1 spray (50 mcg total) by Each Nostril route once daily.     Patient Active Problem List   Diagnosis    Postmenopausal    Anxiety    Severe episode of recurrent major depressive disorder, without psychotic features    Cervicalgia    BMI 27.0-27.9,adult    Chronic tension-type headache, not intractable    Anxiety and depression    Vaping nicotine dependence, non-tobacco product    Chronic musculoskeletal pain    Liver lesion, right lobe    Localized osteoporosis without current pathological fracture    Degenerative cervical disc    Hepatic hemangioma     "Pelvic pain    Hepatic cyst     Review of Systems   Constitutional: Negative for chills, fever, malaise/fatigue and weight loss.   HENT: Negative for congestion, sinus pain and sore throat.    Respiratory: Negative for cough, shortness of breath and wheezing.    Cardiovascular: Negative for chest pain and palpitations.   Gastrointestinal: Positive for diarrhea and nausea. Negative for abdominal pain, blood in stool, constipation, heartburn, melena and vomiting.   Genitourinary: Negative for dysuria, frequency and urgency.   Musculoskeletal: Negative for myalgias.   Skin: Negative for rash.   Neurological: Negative for headaches.         Objective:      There were no vitals taken for this visit.  Estimated body mass index is 25.37 kg/m² as calculated from the following:    Height as of 6/3/21: 5' 7" (1.702 m).    Weight as of 12/14/21: 73.5 kg (162 lb).  Physical Exam  Constitutional:       General: She is not in acute distress.     Appearance: She is not diaphoretic.   HENT:      Head: Normocephalic and atraumatic.   Eyes:      Conjunctiva/sclera: Conjunctivae normal.   Pulmonary:      Effort: Pulmonary effort is normal.   Musculoskeletal:         General: Normal range of motion.   Neurological:      Mental Status: She is alert and oriented to person, place, and time.   Psychiatric:         Mood and Affect: Mood and affect normal.         Cognition and Memory: Memory normal.         Judgment: Judgment normal.           Assessment and Plan:     1. Diarrhea, unspecified type / gastroenteritis  - diarrhea symptoms x3 weeks, discussed that she can continue peptobismol, can do a trial of immodium. Otherwise feels fine except persistent loose stools. If symptoms still persistent by next week, discussed with patient that she will benefit from stool studies.  - Stool culture; Fut   - Stool Exam-Ova,Cysts,Parasites; Future  - WBC, Stool; Future  - Clostridium difficile EIA; Future          Follow up:   No follow-ups on " file.     Other Orders Placed This Visit:  Orders Placed This Encounter   Procedures    Stool culture     Standing Status:   Future     Standing Expiration Date:   6/28/2023    Clostridium difficile EIA     Standing Status:   Future     Standing Expiration Date:   8/27/2023    Stool Exam-Ova,Cysts,Parasites     Standing Status:   Future     Standing Expiration Date:   6/28/2023    WBC, Stool     Standing Status:   Future     Standing Expiration Date:   6/28/2023           Abdullahi Cruz MD        This note is dictated on M*Modal word recognition program.  There are word recognition mistakes that are occasionally missed on review.

## 2022-07-06 ENCOUNTER — LAB VISIT (OUTPATIENT)
Dept: LAB | Facility: HOSPITAL | Age: 62
End: 2022-07-06
Attending: FAMILY MEDICINE
Payer: MEDICAID

## 2022-07-06 DIAGNOSIS — R19.7 DIARRHEA, UNSPECIFIED TYPE: ICD-10-CM

## 2022-07-06 LAB — WBC #/AREA STL HPF: NORMAL /[HPF]

## 2022-07-06 PROCEDURE — 87045 FECES CULTURE AEROBIC BACT: CPT | Performed by: FAMILY MEDICINE

## 2022-07-06 PROCEDURE — 89055 LEUKOCYTE ASSESSMENT FECAL: CPT | Performed by: FAMILY MEDICINE

## 2022-07-06 PROCEDURE — 87209 SMEAR COMPLEX STAIN: CPT | Performed by: FAMILY MEDICINE

## 2022-07-06 PROCEDURE — 87427 SHIGA-LIKE TOXIN AG IA: CPT | Performed by: FAMILY MEDICINE

## 2022-07-06 PROCEDURE — 87177 OVA AND PARASITES SMEARS: CPT | Performed by: FAMILY MEDICINE

## 2022-07-06 PROCEDURE — 87449 NOS EACH ORGANISM AG IA: CPT | Performed by: FAMILY MEDICINE

## 2022-07-06 PROCEDURE — 87046 STOOL CULTR AEROBIC BACT EA: CPT | Mod: 59 | Performed by: FAMILY MEDICINE

## 2022-07-07 LAB
E COLI SXT1 STL QL IA: NEGATIVE
E COLI SXT2 STL QL IA: NEGATIVE

## 2022-07-09 LAB — BACTERIA STL CULT: NORMAL

## 2022-07-11 ENCOUNTER — PATIENT MESSAGE (OUTPATIENT)
Dept: PRIMARY CARE CLINIC | Facility: CLINIC | Age: 62
End: 2022-07-11
Payer: MEDICAID

## 2022-07-11 DIAGNOSIS — A07.4 INFECTION OF INTESTINE DUE TO CYCLOSPORA CAYETANENSIS: Primary | ICD-10-CM

## 2022-07-12 ENCOUNTER — PATIENT MESSAGE (OUTPATIENT)
Dept: PRIMARY CARE CLINIC | Facility: CLINIC | Age: 62
End: 2022-07-12
Payer: MEDICAID

## 2022-07-12 LAB — O+P STL MICRO: ABNORMAL

## 2022-07-12 RX ORDER — SULFAMETHOXAZOLE AND TRIMETHOPRIM 800; 160 MG/1; MG/1
1 TABLET ORAL 2 TIMES DAILY
Qty: 14 TABLET | Refills: 0 | Status: SHIPPED | OUTPATIENT
Start: 2022-07-12 | End: 2022-07-20

## 2022-10-17 ENCOUNTER — OFFICE VISIT (OUTPATIENT)
Dept: PRIMARY CARE CLINIC | Facility: CLINIC | Age: 62
End: 2022-10-17
Payer: MEDICAID

## 2022-10-17 ENCOUNTER — PATIENT MESSAGE (OUTPATIENT)
Dept: PRIMARY CARE CLINIC | Facility: CLINIC | Age: 62
End: 2022-10-17

## 2022-10-17 ENCOUNTER — LAB VISIT (OUTPATIENT)
Dept: LAB | Facility: HOSPITAL | Age: 62
End: 2022-10-17
Attending: STUDENT IN AN ORGANIZED HEALTH CARE EDUCATION/TRAINING PROGRAM
Payer: MEDICAID

## 2022-10-17 ENCOUNTER — TELEPHONE (OUTPATIENT)
Dept: UROGYNECOLOGY | Facility: CLINIC | Age: 62
End: 2022-10-17
Payer: MEDICAID

## 2022-10-17 ENCOUNTER — TELEPHONE (OUTPATIENT)
Dept: PRIMARY CARE CLINIC | Facility: CLINIC | Age: 62
End: 2022-10-17

## 2022-10-17 ENCOUNTER — TELEPHONE (OUTPATIENT)
Dept: ORTHOPEDICS | Facility: CLINIC | Age: 62
End: 2022-10-17
Payer: MEDICAID

## 2022-10-17 VITALS
HEIGHT: 67 IN | SYSTOLIC BLOOD PRESSURE: 118 MMHG | TEMPERATURE: 99 F | WEIGHT: 139.13 LBS | HEART RATE: 64 BPM | BODY MASS INDEX: 21.84 KG/M2 | DIASTOLIC BLOOD PRESSURE: 74 MMHG

## 2022-10-17 DIAGNOSIS — Z00.00 ANNUAL PHYSICAL EXAM: Primary | ICD-10-CM

## 2022-10-17 DIAGNOSIS — D18.03 HEPATIC HEMANGIOMA: ICD-10-CM

## 2022-10-17 DIAGNOSIS — K76.89 HEPATIC CYST: ICD-10-CM

## 2022-10-17 DIAGNOSIS — E04.2 MULTIPLE THYROID NODULES: ICD-10-CM

## 2022-10-17 DIAGNOSIS — Z12.11 SCREENING FOR COLON CANCER: Primary | ICD-10-CM

## 2022-10-17 DIAGNOSIS — M81.6 LOCALIZED OSTEOPOROSIS WITHOUT CURRENT PATHOLOGICAL FRACTURE: ICD-10-CM

## 2022-10-17 DIAGNOSIS — M50.30 DEGENERATIVE CERVICAL DISC: ICD-10-CM

## 2022-10-17 DIAGNOSIS — F17.200 VAPING NICOTINE DEPENDENCE, NON-TOBACCO PRODUCT: ICD-10-CM

## 2022-10-17 DIAGNOSIS — R10.2 PELVIC PAIN: ICD-10-CM

## 2022-10-17 DIAGNOSIS — Z12.11 SCREENING FOR COLON CANCER: ICD-10-CM

## 2022-10-17 DIAGNOSIS — Z12.31 ENCOUNTER FOR SCREENING MAMMOGRAM FOR MALIGNANT NEOPLASM OF BREAST: ICD-10-CM

## 2022-10-17 DIAGNOSIS — Z00.00 ANNUAL PHYSICAL EXAM: ICD-10-CM

## 2022-10-17 PROBLEM — E04.9 THYROID ENLARGEMENT: Status: ACTIVE | Noted: 2022-10-17

## 2022-10-17 PROBLEM — M54.2 CERVICALGIA: Status: RESOLVED | Noted: 2019-09-30 | Resolved: 2022-10-17

## 2022-10-17 PROBLEM — M79.18 CHRONIC MUSCULOSKELETAL PAIN: Status: RESOLVED | Noted: 2020-10-01 | Resolved: 2022-10-17

## 2022-10-17 PROBLEM — G89.29 CHRONIC MUSCULOSKELETAL PAIN: Status: RESOLVED | Noted: 2020-10-01 | Resolved: 2022-10-17

## 2022-10-17 LAB
25(OH)D3+25(OH)D2 SERPL-MCNC: 43 NG/ML (ref 30–96)
ALBUMIN SERPL BCP-MCNC: 4 G/DL (ref 3.5–5.2)
ALP SERPL-CCNC: 66 U/L (ref 55–135)
ALT SERPL W/O P-5'-P-CCNC: 9 U/L (ref 10–44)
ANION GAP SERPL CALC-SCNC: 8 MMOL/L (ref 8–16)
AST SERPL-CCNC: 17 U/L (ref 10–40)
BASOPHILS # BLD AUTO: 0.04 K/UL (ref 0–0.2)
BASOPHILS NFR BLD: 0.9 % (ref 0–1.9)
BILIRUB SERPL-MCNC: 0.6 MG/DL (ref 0.1–1)
BUN SERPL-MCNC: 9 MG/DL (ref 6–20)
CALCIUM SERPL-MCNC: 9.8 MG/DL (ref 8.7–10.5)
CHLORIDE SERPL-SCNC: 104 MMOL/L (ref 95–110)
CHOLEST SERPL-MCNC: 219 MG/DL (ref 120–199)
CHOLEST/HDLC SERPL: 3.7 {RATIO} (ref 2–5)
CO2 SERPL-SCNC: 26 MMOL/L (ref 23–29)
CREAT SERPL-MCNC: 0.8 MG/DL (ref 0.5–1.4)
DIFFERENTIAL METHOD: NORMAL
EOSINOPHIL # BLD AUTO: 0 K/UL (ref 0–0.5)
EOSINOPHIL NFR BLD: 0.9 % (ref 0–8)
ERYTHROCYTE [DISTWIDTH] IN BLOOD BY AUTOMATED COUNT: 12.7 % (ref 11.5–14.5)
EST. GFR  (NO RACE VARIABLE): >60 ML/MIN/1.73 M^2
ESTIMATED AVG GLUCOSE: 100 MG/DL (ref 68–131)
GLUCOSE SERPL-MCNC: 91 MG/DL (ref 70–110)
HBA1C MFR BLD: 5.1 % (ref 4–5.6)
HCT VFR BLD AUTO: 41 % (ref 37–48.5)
HDLC SERPL-MCNC: 59 MG/DL (ref 40–75)
HDLC SERPL: 26.9 % (ref 20–50)
HGB BLD-MCNC: 13.8 G/DL (ref 12–16)
IMM GRANULOCYTES # BLD AUTO: 0 K/UL (ref 0–0.04)
IMM GRANULOCYTES NFR BLD AUTO: 0 % (ref 0–0.5)
LDLC SERPL CALC-MCNC: 135.8 MG/DL (ref 63–159)
LYMPHOCYTES # BLD AUTO: 2.2 K/UL (ref 1–4.8)
LYMPHOCYTES NFR BLD: 47.3 % (ref 18–48)
MCH RBC QN AUTO: 30 PG (ref 27–31)
MCHC RBC AUTO-ENTMCNC: 33.7 G/DL (ref 32–36)
MCV RBC AUTO: 89 FL (ref 82–98)
MONOCYTES # BLD AUTO: 0.4 K/UL (ref 0.3–1)
MONOCYTES NFR BLD: 9.5 % (ref 4–15)
NEUTROPHILS # BLD AUTO: 1.9 K/UL (ref 1.8–7.7)
NEUTROPHILS NFR BLD: 41.4 % (ref 38–73)
NONHDLC SERPL-MCNC: 160 MG/DL
NRBC BLD-RTO: 0 /100 WBC
PLATELET # BLD AUTO: 224 K/UL (ref 150–450)
PMV BLD AUTO: 12.2 FL (ref 9.2–12.9)
POTASSIUM SERPL-SCNC: 3.6 MMOL/L (ref 3.5–5.1)
PROT SERPL-MCNC: 7.3 G/DL (ref 6–8.4)
RBC # BLD AUTO: 4.6 M/UL (ref 4–5.4)
SODIUM SERPL-SCNC: 138 MMOL/L (ref 136–145)
TRIGL SERPL-MCNC: 121 MG/DL (ref 30–150)
TSH SERPL DL<=0.005 MIU/L-ACNC: 1.34 UIU/ML (ref 0.4–4)
WBC # BLD AUTO: 4.61 K/UL (ref 3.9–12.7)

## 2022-10-17 PROCEDURE — 83036 HEMOGLOBIN GLYCOSYLATED A1C: CPT | Performed by: STUDENT IN AN ORGANIZED HEALTH CARE EDUCATION/TRAINING PROGRAM

## 2022-10-17 PROCEDURE — 36415 COLL VENOUS BLD VENIPUNCTURE: CPT | Mod: PN | Performed by: STUDENT IN AN ORGANIZED HEALTH CARE EDUCATION/TRAINING PROGRAM

## 2022-10-17 PROCEDURE — 1159F MED LIST DOCD IN RCRD: CPT | Mod: CPTII,,, | Performed by: STUDENT IN AN ORGANIZED HEALTH CARE EDUCATION/TRAINING PROGRAM

## 2022-10-17 PROCEDURE — 3078F DIAST BP <80 MM HG: CPT | Mod: CPTII,,, | Performed by: STUDENT IN AN ORGANIZED HEALTH CARE EDUCATION/TRAINING PROGRAM

## 2022-10-17 PROCEDURE — 1159F PR MEDICATION LIST DOCUMENTED IN MEDICAL RECORD: ICD-10-PCS | Mod: CPTII,,, | Performed by: STUDENT IN AN ORGANIZED HEALTH CARE EDUCATION/TRAINING PROGRAM

## 2022-10-17 PROCEDURE — 99214 PR OFFICE/OUTPT VISIT, EST, LEVL IV, 30-39 MIN: ICD-10-PCS | Mod: S$PBB,,, | Performed by: STUDENT IN AN ORGANIZED HEALTH CARE EDUCATION/TRAINING PROGRAM

## 2022-10-17 PROCEDURE — 85025 COMPLETE CBC W/AUTO DIFF WBC: CPT | Performed by: STUDENT IN AN ORGANIZED HEALTH CARE EDUCATION/TRAINING PROGRAM

## 2022-10-17 PROCEDURE — 99999 PR PBB SHADOW E&M-EST. PATIENT-LVL IV: ICD-10-PCS | Mod: PBBFAC,,, | Performed by: STUDENT IN AN ORGANIZED HEALTH CARE EDUCATION/TRAINING PROGRAM

## 2022-10-17 PROCEDURE — 82306 VITAMIN D 25 HYDROXY: CPT | Performed by: STUDENT IN AN ORGANIZED HEALTH CARE EDUCATION/TRAINING PROGRAM

## 2022-10-17 PROCEDURE — 99214 OFFICE O/P EST MOD 30 MIN: CPT | Mod: PBBFAC,PN | Performed by: STUDENT IN AN ORGANIZED HEALTH CARE EDUCATION/TRAINING PROGRAM

## 2022-10-17 PROCEDURE — 99214 OFFICE O/P EST MOD 30 MIN: CPT | Mod: S$PBB,,, | Performed by: STUDENT IN AN ORGANIZED HEALTH CARE EDUCATION/TRAINING PROGRAM

## 2022-10-17 PROCEDURE — 80061 LIPID PANEL: CPT | Performed by: STUDENT IN AN ORGANIZED HEALTH CARE EDUCATION/TRAINING PROGRAM

## 2022-10-17 PROCEDURE — 84443 ASSAY THYROID STIM HORMONE: CPT | Performed by: STUDENT IN AN ORGANIZED HEALTH CARE EDUCATION/TRAINING PROGRAM

## 2022-10-17 PROCEDURE — 3074F SYST BP LT 130 MM HG: CPT | Mod: CPTII,,, | Performed by: STUDENT IN AN ORGANIZED HEALTH CARE EDUCATION/TRAINING PROGRAM

## 2022-10-17 PROCEDURE — 3074F PR MOST RECENT SYSTOLIC BLOOD PRESSURE < 130 MM HG: ICD-10-PCS | Mod: CPTII,,, | Performed by: STUDENT IN AN ORGANIZED HEALTH CARE EDUCATION/TRAINING PROGRAM

## 2022-10-17 PROCEDURE — 99999 PR PBB SHADOW E&M-EST. PATIENT-LVL IV: CPT | Mod: PBBFAC,,, | Performed by: STUDENT IN AN ORGANIZED HEALTH CARE EDUCATION/TRAINING PROGRAM

## 2022-10-17 PROCEDURE — 80053 COMPREHEN METABOLIC PANEL: CPT | Performed by: STUDENT IN AN ORGANIZED HEALTH CARE EDUCATION/TRAINING PROGRAM

## 2022-10-17 PROCEDURE — 3078F PR MOST RECENT DIASTOLIC BLOOD PRESSURE < 80 MM HG: ICD-10-PCS | Mod: CPTII,,, | Performed by: STUDENT IN AN ORGANIZED HEALTH CARE EDUCATION/TRAINING PROGRAM

## 2022-10-17 RX ORDER — GABAPENTIN 300 MG/1
300 CAPSULE ORAL NIGHTLY
COMMUNITY
Start: 2022-07-22

## 2022-10-17 RX ORDER — CHLORZOXAZONE 500 MG/1
500 TABLET ORAL 4 TIMES DAILY PRN
COMMUNITY

## 2022-10-17 NOTE — TELEPHONE ENCOUNTER
----- Message from Nancy Kruger sent at 10/17/2022  3:29 PM CDT -----  Regarding: appt  Contact: [phone]  Pt is requesting a NP appt. Please call to discuss further.

## 2022-10-17 NOTE — TELEPHONE ENCOUNTER
I called patient she did not answer so I left her a voicemail to call back to make an appointment.

## 2022-10-17 NOTE — TELEPHONE ENCOUNTER
----- Message from Sylvia Stewart sent at 10/17/2022 12:15 PM CDT -----  Regarding: order needed  Contact: patient [phone]  Order is needed for colonoscopy.  Please call and advise.

## 2022-10-17 NOTE — TELEPHONE ENCOUNTER
Called patient regarding earlier appointment request. Patient scheduled with Dr. Luevano for 11/1.

## 2022-10-17 NOTE — PROGRESS NOTES
"  Linda Kahn  1960        Subjective     Chief Complaint: Est Care    History of Present Illness:  Ms. Linda Kahn is a 62 y.o. female who presents to clinic for est care.    Was seeing Dr. ESCALANTE.     Saw Dr. Cruz for diarrhea, found to have Cyclospora. Tx with Bactrim. Lost weight. From chicken salad from [facility]. Much improved.      Thyroid nodules- initially found on MRI of c-spine at outside facility per PCP note in 6/2022.   U/S  soft tissue head and neck done 6/2021-right lower lobe 8 mm heterogeneous mostly solid nodule, 4 mm cystic and solid nodule also one right lower lobe; slightly hypoechoic solid appearing 10 mm left lobe nodule-->needs repeat yearly    Hepatic Hemangioma- has a left hepatic cyst and hypoechoic right lesion on right lobe liver (thought to be hemangioma). "Stable" on last imaging.  Per PCP note, "no further intervention" -- has not seen hepatology. U/S done 10/15/2021.    Osteoporosis- Considering Prolia, not yet on. DEXA done with Gyn 10/26/2020. Due for repeat DEXA now. On calcium and vitamin D supplements OTC.     Nicotine dependence-vaping, due for pneumonia vaccine.     Pelvic Pain- was planning on seeing Urogyn, straining to urinate.     DDD- in neck and back, sees specialist, in PT. Twice a week Planning on getting median branch block through outside main management.     Mammogram due 12/2022.   Pap due 10/2025 initially had to be repeated due to lack of cells on sample but pt forgot to go, needs to repeat.  DEXA-due now.  Colon-due now.    Wt Readings from Last 5 Encounters:   10/17/22 63.1 kg (139 lb 1.8 oz)   12/14/21 73.5 kg (162 lb)   06/03/21 80.5 kg (177 lb 7.5 oz)   10/20/20 78.5 kg (173 lb 1 oz)   10/13/20 80.7 kg (178 lb)     Due for tdap.      Review of Systems   Constitutional:  Positive for weight loss. Negative for chills, fever and malaise/fatigue.   HENT:  Negative for sore throat.    Respiratory:  Negative for cough and shortness of breath.  "   Cardiovascular:  Negative for leg swelling.   Gastrointestinal:  Negative for abdominal pain, diarrhea, nausea and vomiting.   Musculoskeletal:  Positive for back pain and neck pain.   Skin:  Negative for rash.   Neurological:  Negative for dizziness, speech change and weakness.      PAST HISTORY:     Past Medical History:   Diagnosis Date    DDD (degenerative disc disease), cervical     Hemangioma of liver     History of thyroid nodule     Nicotine dependence     Osteoporosis        Past Surgical History:   Procedure Laterality Date    OOPHORECTOMY Left        Family History   Problem Relation Age of Onset    Coronary artery disease Father     Hypertension Father     No Known Problems Sister     No Known Problems Brother     No Known Problems Brother     No Known Problems Brother     No Known Problems Brother     No Known Problems Sister        Social History     Socioeconomic History    Marital status: Single   Tobacco Use    Smoking status: Every Day     Types: Vaping with nicotine    Smokeless tobacco: Never   Substance and Sexual Activity    Alcohol use: Yes    Drug use: Not Currently     Types: Cocaine, Marijuana, Methamphetamines, Barbituates, Hashish, LSD, MDMA (Ecstacy), PCP, Psilocybin     Comment: last used > 10 years ago    Sexual activity: Not Currently     Partners: Male       MEDICATIONS & ALLERGIES:     Current Outpatient Medications on File Prior to Visit   Medication Sig    ALPRAZolam (XANAX) 0.5 MG tablet Take 1 tablet (0.5 mg total) by mouth daily as needed for Anxiety.    chlorzoxazone (PARAFON FORTE) 500 mg Tab Take 500 mg by mouth 4 (four) times daily as needed.    citalopram (CELEXA) 20 MG tablet Take 1 tablet (20 mg total) by mouth once daily.    fluticasone propionate (FLONASE) 50 mcg/actuation nasal spray instill 1 spray (50 mcg total) by Each Nostril route once daily.    gabapentin (NEURONTIN) 300 MG capsule Take 300 mg by mouth every evening.    cyclobenzaprine (FLEXERIL) 10 MG tablet  "Take 1 tablet (10 mg total) by mouth 3 (three) times daily as needed for Muscle spasms. (Patient not taking: No sig reported)    estradioL (ESTRACE) 0.01 % (0.1 mg/gram) vaginal cream apply 1 gram Twice per day for 2 weeks, then apply 1 gram twice a week there after     No current facility-administered medications on file prior to visit.       Review of patient's allergies indicates:   Allergen Reactions    Penicillins Itching and Nausea Only    Cephalexin Hives, Itching, Rash and Swelling    Meperidine Itching and Rash       OBJECTIVE:     Vital Signs:  Vitals:    10/17/22 0909   BP: 118/74   BP Location: Right arm   Patient Position: Sitting   BP Method: Medium (Manual)   Pulse: 64   Temp: 98.8 °F (37.1 °C)   TempSrc: Oral   Weight: 63.1 kg (139 lb 1.8 oz)   Height: 5' 7" (1.702 m)       Body mass index is 21.79 kg/m².     Physical Exam:  Physical Exam  Vitals and nursing note reviewed.   Constitutional:       General: She is not in acute distress.     Appearance: Normal appearance. She is not ill-appearing, toxic-appearing or diaphoretic.   HENT:      Head: Normocephalic and atraumatic.   Eyes:      General: No scleral icterus.     Conjunctiva/sclera: Conjunctivae normal.   Neck:      Comments: Small nodule palpated on left thyroid  Cardiovascular:      Rate and Rhythm: Normal rate.      Heart sounds: No murmur heard.  Pulmonary:      Effort: Pulmonary effort is normal. No respiratory distress.   Musculoskeletal:         General: No swelling. Normal range of motion.      Cervical back: Normal range of motion and neck supple. No rigidity or tenderness.      Right lower leg: No edema.      Left lower leg: No edema.      Comments: Left ankle in brace   Lymphadenopathy:      Cervical: No cervical adenopathy.   Skin:     General: Skin is warm and dry.   Neurological:      General: No focal deficit present.      Mental Status: She is alert and oriented to person, place, and time.      Motor: No weakness.   Psychiatric: "         Mood and Affect: Mood and affect normal.         Behavior: Behavior normal.          Laboratory  Lab Results   Component Value Date    WBC 5.29 10/01/2020    HGB 12.6 10/01/2020    HCT 39.0 10/01/2020    MCV 92 10/01/2020     10/01/2020     Lab Results   Component Value Date    GLU 90 10/01/2020     10/01/2020    K 3.5 10/01/2020     10/01/2020    CO2 27 10/01/2020    BUN 16 10/01/2020    CREATININE 0.7 10/01/2020    CALCIUM 8.9 10/01/2020     No results found for: INR, PROTIME  Lab Results   Component Value Date    HGBA1C 5.2 10/01/2020           Health Maintenance         Date Due Completion Date    Pneumococcal Vaccines (Age 0-64) (1 - PCV) Never done ---    TETANUS VACCINE Never done ---    COVID-19 Vaccine (3 - Booster for Hubert series) 01/05/2022 11/10/2021    Colorectal Cancer Screening 02/19/2022 2/19/2021    Influenza Vaccine (1) 09/01/2022 10/15/2021    DEXA Scan 10/26/2022 10/26/2020    Mammogram 12/14/2022 12/14/2021    HIV Screening 10/01/2026 (Originally 6/14/1975) ---    Lipid Panel 10/01/2025 10/1/2020    Cervical Cancer Screening 10/20/2025 10/20/2020              ASSESSMENT & PLAN:   Ms. Linda Kahn is a 62 y.o. female who was seen today in clinic for est care, annual.      1. Annual physical exam  -     TSH; Future; Expected date: 10/17/2022  -     HEMOGLOBIN A1C; Future; Expected date: 10/17/2022  -     Lipid Panel; Future; Expected date: 10/17/2022  -     Comprehensive Metabolic Panel; Future; Expected date: 10/17/2022  -     CBC Auto Differential; Future; Expected date: 10/17/2022  -     Vitamin D; Future; Expected date: 10/17/2022  -     Needs repeat PAP (not enough cells last time), will try and help schedule with OBGYN    2. Multiple thyroid nodules  -     US Soft Tissue Head Neck Thyroid; Future; Expected date: 10/17/2022  -     TSH; Future; Expected date: 10/17/2022  -     Yearly follow-up, FNA if needed per radiology     3. Localized osteoporosis without  current pathological fracture  -     DXA Bone Density Spine And Hip; Future; Expected date: 10/17/2022  -     Comprehensive Metabolic Panel; Future; Expected date: 10/17/2022  -     Vitamin D; Future; Expected date: 10/17/2022  -     Not on Prolia due to concern regarding side-effects/long-term nature of medication, can discuss endo referral for other options if needed    4. Hepatic hemangioma  -     US Abdomen Complete; Future; Expected date: 10/17/2022  -     HEMOGLOBIN A1C; Future; Expected date: 10/17/2022  -     Lipid Panel; Future; Expected date: 10/17/2022  -     CBC Auto Differential; Future; Expected date: 10/17/2022  -     Hepatology if needed, per chart review has not seen before    5. Vaping nicotine dependence, non-tobacco product  -Pneumonia vaccine today    6. Degenerative cervical disc  -Following with outside network pain management     7. Hepatic cyst  -     US Abdomen Complete; Future; Expected date: 10/17/2022    8. Screening for colon cancer  -     Case Request Endoscopy: COLONOSCOPY    9. Encounter for screening mammogram for malignant neoplasm of breast  -     Mammo Digital Screening Bilat w/ Abe; Future; Expected date: 12/15/2022    10. Pelvic pain  -Wanted to see urogyn, will ask to help schedule         RTC in 6m-1yr.     Tdap and Pneumonia vaccine today.      Rosa Martin MD  Internal Medicine

## 2022-10-18 ENCOUNTER — PATIENT MESSAGE (OUTPATIENT)
Dept: PRIMARY CARE CLINIC | Facility: CLINIC | Age: 62
End: 2022-10-18
Payer: MEDICAID

## 2022-10-18 RX ORDER — ESTRADIOL 0.1 MG/G
1 CREAM VAGINAL DAILY
Qty: 42.5 G | Refills: 0 | Status: SHIPPED | OUTPATIENT
Start: 2022-10-18 | End: 2023-02-16 | Stop reason: SDUPTHER

## 2022-10-18 NOTE — TELEPHONE ENCOUNTER
Per request, will fill 30 days only  until she can see urogyn (11/1), medication was previously ordered by OBGYN.

## 2022-10-28 ENCOUNTER — PATIENT MESSAGE (OUTPATIENT)
Dept: PRIMARY CARE CLINIC | Facility: CLINIC | Age: 62
End: 2022-10-28
Payer: MEDICAID

## 2022-11-01 ENCOUNTER — OFFICE VISIT (OUTPATIENT)
Dept: UROGYNECOLOGY | Facility: CLINIC | Age: 62
End: 2022-11-01
Payer: MEDICAID

## 2022-11-01 VITALS
WEIGHT: 141 LBS | HEIGHT: 67 IN | DIASTOLIC BLOOD PRESSURE: 71 MMHG | SYSTOLIC BLOOD PRESSURE: 121 MMHG | BODY MASS INDEX: 22.13 KG/M2 | HEART RATE: 67 BPM

## 2022-11-01 DIAGNOSIS — M79.18 MYOFASCIAL PAIN: Primary | ICD-10-CM

## 2022-11-01 DIAGNOSIS — R33.9 INCOMPLETE BLADDER EMPTYING: ICD-10-CM

## 2022-11-01 DIAGNOSIS — R39.13 INTERMITTENT URINARY STREAM: ICD-10-CM

## 2022-11-01 DIAGNOSIS — R10.2 PELVIC PAIN: ICD-10-CM

## 2022-11-01 LAB
BILIRUB UR QL STRIP: NEGATIVE
GLUCOSE UR QL STRIP: NEGATIVE
KETONES UR QL STRIP: NEGATIVE
LEUKOCYTE ESTERASE UR QL STRIP: NEGATIVE
PH, POC UA: 5
POC BLOOD, URINE: NEGATIVE
POC NITRATES, URINE: NEGATIVE
PROT UR QL STRIP: NEGATIVE
SP GR UR STRIP: 1.01 (ref 1–1.03)
UROBILINOGEN UR STRIP-ACNC: 0.1 (ref 0.1–1.1)

## 2022-11-01 PROCEDURE — 99999 PR PBB SHADOW E&M-EST. PATIENT-LVL IV: ICD-10-PCS | Mod: PBBFAC,,, | Performed by: OBSTETRICS & GYNECOLOGY

## 2022-11-01 PROCEDURE — 99215 OFFICE O/P EST HI 40 MIN: CPT | Mod: 25,S$PBB,, | Performed by: OBSTETRICS & GYNECOLOGY

## 2022-11-01 PROCEDURE — 3044F HG A1C LEVEL LT 7.0%: CPT | Mod: CPTII,,, | Performed by: OBSTETRICS & GYNECOLOGY

## 2022-11-01 PROCEDURE — 99214 OFFICE O/P EST MOD 30 MIN: CPT | Mod: PBBFAC | Performed by: OBSTETRICS & GYNECOLOGY

## 2022-11-01 PROCEDURE — 1159F PR MEDICATION LIST DOCUMENTED IN MEDICAL RECORD: ICD-10-PCS | Mod: CPTII,,, | Performed by: OBSTETRICS & GYNECOLOGY

## 2022-11-01 PROCEDURE — 99215 PR OFFICE/OUTPT VISIT, EST, LEVL V, 40-54 MIN: ICD-10-PCS | Mod: 25,S$PBB,, | Performed by: OBSTETRICS & GYNECOLOGY

## 2022-11-01 PROCEDURE — 99999 PR PBB SHADOW E&M-EST. PATIENT-LVL IV: CPT | Mod: PBBFAC,,, | Performed by: OBSTETRICS & GYNECOLOGY

## 2022-11-01 PROCEDURE — 3044F PR MOST RECENT HEMOGLOBIN A1C LEVEL <7.0%: ICD-10-PCS | Mod: CPTII,,, | Performed by: OBSTETRICS & GYNECOLOGY

## 2022-11-01 PROCEDURE — 3074F PR MOST RECENT SYSTOLIC BLOOD PRESSURE < 130 MM HG: ICD-10-PCS | Mod: CPTII,,, | Performed by: OBSTETRICS & GYNECOLOGY

## 2022-11-01 PROCEDURE — 3074F SYST BP LT 130 MM HG: CPT | Mod: CPTII,,, | Performed by: OBSTETRICS & GYNECOLOGY

## 2022-11-01 PROCEDURE — 1160F PR REVIEW ALL MEDS BY PRESCRIBER/CLIN PHARMACIST DOCUMENTED: ICD-10-PCS | Mod: CPTII,,, | Performed by: OBSTETRICS & GYNECOLOGY

## 2022-11-01 PROCEDURE — 51798 US URINE CAPACITY MEASURE: CPT | Mod: PBBFAC | Performed by: OBSTETRICS & GYNECOLOGY

## 2022-11-01 PROCEDURE — 1159F MED LIST DOCD IN RCRD: CPT | Mod: CPTII,,, | Performed by: OBSTETRICS & GYNECOLOGY

## 2022-11-01 PROCEDURE — 3078F PR MOST RECENT DIASTOLIC BLOOD PRESSURE < 80 MM HG: ICD-10-PCS | Mod: CPTII,,, | Performed by: OBSTETRICS & GYNECOLOGY

## 2022-11-01 PROCEDURE — 1160F RVW MEDS BY RX/DR IN RCRD: CPT | Mod: CPTII,,, | Performed by: OBSTETRICS & GYNECOLOGY

## 2022-11-01 PROCEDURE — 3078F DIAST BP <80 MM HG: CPT | Mod: CPTII,,, | Performed by: OBSTETRICS & GYNECOLOGY

## 2022-11-01 NOTE — PROGRESS NOTES
Chief Complaint   Patient presents with    Pelvic Pain     Inconsistent Urination.    Vaginal Atrophy    Abnormal Pap Smear        HPI: Patient is a 62 y.o. female  who presents today with c/o an intermittent urinary stream. Finds that she will start and stop and may have to reposition on the toilet to get the flow started again. Notices that symptoms are worse with her first void of the morning when her bladder is full. She states that she feels like she is not emptying well. When she waits a little she feels like she can have another full stream. Symptoms have been occurring for at least two years. She has deferred addressing this problem due to osteoporosis and neck and back problems/pain.  She denies urinary urgency. She notices that when she has the urge to void she finds that she has difficulty starting her urine stream. She states that she may have some urinary frequency, voiding every hour. She wakes once per night but she is not woken by the urge to void. She is woken by her cat. She reports post void dribbling but denies stress urinary incontinence and urgency urinary incontinence. She drinks water and juice throughout the day.    She denies vaginal prolapse symptoms, heaviness, pressure and bulge.     She did recently have diarrhea for a prolonged period of time. Her bowel habits are normalizing after the infection. She denies accidental bowel leakage.       Review of Systems   Constitutional:  Positive for unexpected weight change (GI infection leading to an additional 13lbs weight loss). Negative for activity change, appetite change, chills, fatigue and fever.   HENT:  Negative for nasal congestion, dental problem, hearing loss, mouth dryness and sore throat.    Eyes:  Negative for pain and eye dryness.   Respiratory:  Negative for cough, shortness of breath and wheezing.    Cardiovascular:  Negative for chest pain, palpitations and leg swelling.   Gastrointestinal:  Negative for abdominal  distention, abdominal pain, blood in stool, constipation and rectal pain.   Endocrine: Negative for cold intolerance and heat intolerance.   Genitourinary:  Positive for vaginal dryness. Negative for dyspareunia and hot flashes.        Decreased libido     Musculoskeletal:  Positive for arthralgias, back pain, neck pain and neck stiffness. Negative for gait problem, joint swelling and myalgias.   Integumentary:  Negative for rash.   Neurological:  Positive for headaches. Negative for dizziness, tremors, seizures, speech difficulty, weakness, light-headedness and numbness.   Psychiatric/Behavioral:  Positive for dysphoric mood. Negative for confusion and sleep disturbance. The patient is nervous/anxious.       Past Medical History:   Diagnosis Date    DDD (degenerative disc disease), cervical     Hemangioma of liver     History of thyroid nodule     Nicotine dependence     Osteoporosis        Past Surgical History:   Procedure Laterality Date    OOPHORECTOMY Left     salpingectomy Bilateral          Current Outpatient Medications:     ALPRAZolam (XANAX) 0.5 MG tablet, Take 1 tablet (0.5 mg total) by mouth daily as needed for Anxiety., Disp: 20 tablet, Rfl: 0    chlorzoxazone (PARAFON FORTE) 500 mg Tab, Take 500 mg by mouth 4 (four) times daily as needed., Disp: , Rfl:     citalopram (CELEXA) 20 MG tablet, Take 1 tablet (20 mg total) by mouth once daily., Disp: 90 tablet, Rfl: 0    diphth,pertus,acell,,tetanus (BOOSTRIX TDAP) 2.5-8-5 Lf-mcg-Lf/0.5mL Syrg injection, Inject into the muscle., Disp: 0.5 mL, Rfl: 0    estradioL (ESTRACE) 0.01 % (0.1 mg/gram) vaginal cream, Place 1 g vaginally once daily., Disp: 42.5 g, Rfl: 0    fluticasone propionate (FLONASE) 50 mcg/actuation nasal spray, instill 1 spray (50 mcg total) by Each Nostril route once daily., Disp: 16 g, Rfl: 0    gabapentin (NEURONTIN) 300 MG capsule, Take 300 mg by mouth every evening., Disp: , Rfl:     pneumoc 20-eliz conj-dip cr,PF, (PREVNAR-20, PF,) 0.5 mL  Syrg injection, Inject 0.5 mLs into the muscle., Disp: 0.5 mL, Rfl: 0    Review of patient's allergies indicates:   Allergen Reactions    Penicillins Itching and Nausea Only    Cephalexin Hives, Itching, Rash and Swelling    Meperidine Itching and Rash       Family History   Problem Relation Age of Onset    Alcohol abuse Mother     Coronary artery disease Father     Hypertension Father     No Known Problems Sister     No Known Problems Sister     No Known Problems Brother     No Known Problems Brother     No Known Problems Brother     No Known Problems Brother        Social History     Socioeconomic History    Marital status: Single   Tobacco Use    Smoking status: Every Day     Types: Vaping with nicotine    Smokeless tobacco: Never   Substance and Sexual Activity    Alcohol use: Yes     Comment: Occasional now 1 x per week, previosuly 7 per week    Drug use: Not Currently     Types: Cocaine, Marijuana, Methamphetamines, Barbituates, Hashish, LSD, MDMA (Ecstacy), PCP, Psilocybin     Comment: occasional marijuana    Sexual activity: Not Currently     Partners: Male   Social History Narrative    Lives with two cats. Feels safe in her home.        OB History          0    Para   0    Term   0       0    AB   0    Living   0         SAB   0    IAB   0    Ectopic   0    Multiple   0    Live Births   0                 Gyn History    Mammogram: 21 BI-RADS 1, negative  Pap smear: 10/20/20, unsatisfactory, insufficient sample, HPV negative  LMP: No LMP recorded. Patient is postmenopausal.       INITIAL PHYSICAL EXAMINATION    Vitals:    22 1014   BP: 121/71   Pulse: 67      General: Healthy in appearance, Well nourished, Affect Normal, NAD.  Neck: Supple, No masses, Trachea midline, Thyroid normal size,  non-tender, no masses or nodules. Mild old ecchymosis along left  Nodes: No clavicular/cervical adenopathy.  Skin: Normal temperature, No atypical lesions or rash.  Heart: Normal sounds, no  murmurs  Lungs: Normal respiratory effort.  Gastrointestinal: tender in the midline suprapubic region, Non distended, No masses, guarding or  rebound, No hepatosplenomegally, No hernia.  Ext: No clubbing, cyanosis, edema or varicosities.  DTR's: 2+ bilaterally  Strength 5/5 bilateral upper and lower extremities    Genitourinary-  Vulva: normal without lesions, masses, atrophy or pain  Urethra: meatus central and normal in appearance, no prolapse/caruncle, no masses or discharge. Empty supine stress test was negative.  Bladder: non-tender, no masses  Vagina: No discharge or lesions, severe atrophy, no masses appreciated.  [See POP-Q]  Cervix: no lesions, no discharge  Uterus:  tender, anteverted, approximately 4 weeks size  Adnexa: no masses, non tender.  Rectal: deferred  Neuro: S2-4- pin prick and light touch intact, Anal wink present  Levator strength: 2/5  Levator tenderness: left 6/10 and right 3/10    POP-Q Exam- pelvic organ prolapse quantitative    Aa -3  Anterior Wall  Ba -3  Anterior wall C -6  Cervix or cuff   Gh 2  Genital hiatus  pb 2  perineal body  tvl 9  Total vaginal length 9   Ap -3  Posterior wall Bp -3  Posterior wall  D -9  Posterior fornix     with Uterus    POP-Q Stage:0      Office Visit on 11/01/2022   Component Date Value Ref Range Status    POC Blood, Urine 11/01/2022 Negative  Negative Final    POC Bilirubin, Urine 11/01/2022 Negative  Negative Final    POC Urobilinogen, Urine 11/01/2022 0.1  0.1 - 1.1 Final    POC Ketones, Urine 11/01/2022 Negative  Negative Final    POC Protein, Urine 11/01/2022 Negative  Negative Final    POC Nitrates, Urine 11/01/2022 Negative  Negative Final    POC Glucose, Urine 11/01/2022 Negative  Negative Final    pH, UA 11/01/2022 5.0   Final    POC Specific Gravity, Urine 11/01/2022 1.015  1.003 - 1.029 Final    POC Leukocytes, Urine 11/01/2022 Negative  Negative Final          Post void residual: 44 ml by bladder scan      ASSESSMENT & PLAN:    Myofascial  pain  -     Ambulatory referral/consult to Physical/Occupational Therapy; Future; Expected date: 11/08/2022    Pelvic pain  -     US Pelvis Complete Non OB; Future; Expected date: 11/01/2022    Intermittent urinary stream  -     Ambulatory referral/consult to Physical/Occupational Therapy; Future; Expected date: 11/08/2022  -     POCT urinalysis, dipstick or tablet reag    Incomplete bladder emptying  -     POCT Bladder Scan     Exam findings and treatment options were discussed in detail with the patient. The various etiologies of her pelvic/lower urinary tract symptoms were discussed and a presumptive diagnosis as above reviewed. I have recommended further evaluation with a pelvic ultrasound and cystoscopy given the tenderness noted on abdominal and pelvic exam in the suprapubic region and on bimanual. I have also recommended pelvic floor physical therapy and we discussed how her neck and back symptoms are likely factoring into her voiding dysfunction and the short tight pelvic floor noted during her exam. We reviewed bladder physiology. Once her evaluation is completed and after she has initiated PT she will be see in follow up at which time her symptoms will be reevaluated. Further options will be considered if she is not significantly improved at that time.    All questions were answered today. The patient was encouraged to contact the office as needed with any additional questions or concerns.     Total time spent on visit was 60 minutes.  This includes face to face time and non-face to face time preparing to see the patient (eg, review of tests), Obtaining and/or reviewing separately obtained history, Documenting clinical information in the electronic or other health record, Independently interpreting resultsand communicating results to the patient/family/caregiver, or Care coordination.    Ericka Luevano MD

## 2022-11-02 ENCOUNTER — PATIENT OUTREACH (OUTPATIENT)
Dept: ADMINISTRATIVE | Facility: HOSPITAL | Age: 62
End: 2022-11-02
Payer: MEDICAID

## 2022-11-02 NOTE — PROGRESS NOTES
ROBY inbasket msg received on 11.1.2022 from Dr. Luevano office requesting a colonoscopy referral. Upon reviewing chart, a referral was placed on 10.18.2022 by Dr. Martin office.

## 2022-11-14 ENCOUNTER — HOSPITAL ENCOUNTER (OUTPATIENT)
Dept: RADIOLOGY | Facility: OTHER | Age: 62
Discharge: HOME | End: 2022-11-14
Attending: STUDENT IN AN ORGANIZED HEALTH CARE EDUCATION/TRAINING PROGRAM
Payer: MEDICAID

## 2022-11-14 ENCOUNTER — PATIENT MESSAGE (OUTPATIENT)
Dept: PRIMARY CARE CLINIC | Facility: CLINIC | Age: 62
End: 2022-11-14
Payer: MEDICAID

## 2022-11-14 ENCOUNTER — HOSPITAL ENCOUNTER (OUTPATIENT)
Dept: RADIOLOGY | Facility: OTHER | Age: 62
Discharge: HOME | End: 2022-11-14
Attending: OBSTETRICS & GYNECOLOGY
Payer: MEDICAID

## 2022-11-14 ENCOUNTER — TELEPHONE (OUTPATIENT)
Dept: PRIMARY CARE CLINIC | Facility: CLINIC | Age: 62
End: 2022-11-14
Payer: MEDICAID

## 2022-11-14 DIAGNOSIS — D18.03 HEPATIC HEMANGIOMA: Primary | ICD-10-CM

## 2022-11-14 DIAGNOSIS — E04.2 MULTIPLE THYROID NODULES: ICD-10-CM

## 2022-11-14 DIAGNOSIS — D18.03 HEPATIC HEMANGIOMA: ICD-10-CM

## 2022-11-14 DIAGNOSIS — R10.2 PELVIC PAIN: ICD-10-CM

## 2022-11-14 DIAGNOSIS — K76.89 HEPATIC CYST: ICD-10-CM

## 2022-11-14 DIAGNOSIS — E04.2 MULTIPLE THYROID NODULES: Primary | ICD-10-CM

## 2022-11-14 PROCEDURE — 76830 TRANSVAGINAL US NON-OB: CPT | Mod: TC

## 2022-11-14 PROCEDURE — 76830 TRANSVAGINAL US NON-OB: CPT | Mod: 26,,, | Performed by: RADIOLOGY

## 2022-11-14 PROCEDURE — 76856 US PELVIS COMP WITH TRANSVAG NON-OB (XPD): ICD-10-PCS | Mod: 26,,, | Performed by: RADIOLOGY

## 2022-11-14 PROCEDURE — 76536 US EXAM OF HEAD AND NECK: CPT | Mod: TC

## 2022-11-14 PROCEDURE — 76700 US ABDOMEN COMPLETE: ICD-10-PCS | Mod: 26,,, | Performed by: RADIOLOGY

## 2022-11-14 PROCEDURE — 76700 US EXAM ABDOM COMPLETE: CPT | Mod: 26,,, | Performed by: RADIOLOGY

## 2022-11-14 PROCEDURE — 76536 US EXAM OF HEAD AND NECK: CPT | Mod: 26,,, | Performed by: RADIOLOGY

## 2022-11-14 PROCEDURE — 76856 US EXAM PELVIC COMPLETE: CPT | Mod: 26,,, | Performed by: RADIOLOGY

## 2022-11-14 PROCEDURE — 76536 US SOFT TISSUE HEAD NECK THYROID: ICD-10-PCS | Mod: 26,,, | Performed by: RADIOLOGY

## 2022-11-14 PROCEDURE — 76700 US EXAM ABDOM COMPLETE: CPT | Mod: TC

## 2022-11-14 PROCEDURE — 76830 US PELVIS COMP WITH TRANSVAG NON-OB (XPD): ICD-10-PCS | Mod: 26,,, | Performed by: RADIOLOGY

## 2022-11-14 NOTE — TELEPHONE ENCOUNTER
Returned call to patient.   Per Dr. Martin -- Can we let her know a few nodules were found in the thyroid. Per the report, we should repeat this next year. It has been ordered.      Thanks!     Dr. Martin    Also labs scheduled for 11/16/2022 per patient request.

## 2022-11-14 NOTE — TELEPHONE ENCOUNTER
----- Message from Nelida Carmona sent at 11/14/2022 11:54 AM CST -----  Contact: Pt [phone]  Patient is returning a phone call.  Who left a message for the patient: Vannessa Sutherland LPN   Does patient know what this is regarding:  US results   Would you like a call back, or a response through your [facility] portal?:   call back   Comments:         Thank you

## 2022-11-14 NOTE — TELEPHONE ENCOUNTER
----- Message from Rosa Martin MD sent at 11/14/2022 10:22 AM CST -----  Regarding: Check in and info for pt  Hi,     Can we let her know her US abdomen showed some mild dilatation of the common duct. Her last set of labs did not show high liver numbers but I will order a repeat. Pending those labs, we can do an MRCP or if she is having abdominal pain.     It also showed a stable left hepatic cyst and right hepatic lobe hemangioma.    Dr. Martin

## 2022-11-14 NOTE — TELEPHONE ENCOUNTER
Can we let her know a few nodules were found in the thyroid. Per the report, we should repeat this next year. It has been ordered.     Thanks!    Dr. Martin

## 2022-11-14 NOTE — PROGRESS NOTES
US abdomen-Mild dilatation of the common duct with no intrahepatic bile duct dilatation.  Correlation with laboratory values advised.  MRCP may be considered, particularly if laboratory values are abnormal.     Stable left hepatic cyst and right hepatic lobe hemangioma.    Last CMP wnl. Will ask pt if having symptoms. If so, can send for MRCP and to GI.

## 2022-11-15 ENCOUNTER — PATIENT MESSAGE (OUTPATIENT)
Dept: PRIMARY CARE CLINIC | Facility: CLINIC | Age: 62
End: 2022-11-15
Payer: MEDICAID

## 2022-11-15 DIAGNOSIS — F41.9 ANXIETY AND DEPRESSION: ICD-10-CM

## 2022-11-15 DIAGNOSIS — F32.A ANXIETY AND DEPRESSION: ICD-10-CM

## 2022-11-15 DIAGNOSIS — F43.29 ADJUSTMENT DISORDER WITH MIXED EMOTIONAL FEATURES: ICD-10-CM

## 2022-11-15 RX ORDER — CITALOPRAM 20 MG/1
20 TABLET, FILM COATED ORAL DAILY
Qty: 90 TABLET | Refills: 0 | Status: SHIPPED | OUTPATIENT
Start: 2022-11-15 | End: 2023-02-16 | Stop reason: SDUPTHER

## 2022-11-15 RX ORDER — ALPRAZOLAM 0.5 MG/1
0.5 TABLET ORAL DAILY PRN
Qty: 20 TABLET | Refills: 0 | OUTPATIENT
Start: 2022-11-15

## 2022-11-18 ENCOUNTER — PATIENT MESSAGE (OUTPATIENT)
Dept: REHABILITATION | Facility: OTHER | Age: 62
End: 2022-11-18
Payer: MEDICAID

## 2022-11-21 ENCOUNTER — CLINICAL SUPPORT (OUTPATIENT)
Dept: ENDOSCOPY | Facility: HOSPITAL | Age: 62
End: 2022-11-21
Payer: MEDICAID

## 2022-11-21 VITALS — HEIGHT: 67 IN | BODY MASS INDEX: 21.97 KG/M2 | WEIGHT: 140 LBS

## 2022-11-21 DIAGNOSIS — Z12.11 SCREENING FOR COLON CANCER: ICD-10-CM

## 2022-11-21 RX ORDER — POLYETHYLENE GLYCOL 3350, SODIUM SULFATE ANHYDROUS, SODIUM BICARBONATE, SODIUM CHLORIDE, POTASSIUM CHLORIDE 236; 22.74; 6.74; 5.86; 2.97 G/4L; G/4L; G/4L; G/4L; G/4L
4 POWDER, FOR SOLUTION ORAL ONCE
Qty: 4000 ML | Refills: 0 | Status: SHIPPED | OUTPATIENT
Start: 2022-11-21 | End: 2022-11-29

## 2022-11-28 ENCOUNTER — LAB VISIT (OUTPATIENT)
Dept: LAB | Facility: HOSPITAL | Age: 62
End: 2022-11-28
Attending: STUDENT IN AN ORGANIZED HEALTH CARE EDUCATION/TRAINING PROGRAM
Payer: MEDICAID

## 2022-11-28 DIAGNOSIS — R39.13 INTERMITTENT URINARY STREAM: ICD-10-CM

## 2022-11-28 DIAGNOSIS — R33.9 INCOMPLETE BLADDER EMPTYING: ICD-10-CM

## 2022-11-28 DIAGNOSIS — D18.03 HEPATIC HEMANGIOMA: ICD-10-CM

## 2022-11-28 DIAGNOSIS — R10.2 PELVIC PAIN: ICD-10-CM

## 2022-11-28 DIAGNOSIS — R35.0 URINARY FREQUENCY: Primary | ICD-10-CM

## 2022-11-28 LAB
ALBUMIN SERPL BCP-MCNC: 3.9 G/DL (ref 3.5–5.2)
ALP SERPL-CCNC: 62 U/L (ref 55–135)
ALT SERPL W/O P-5'-P-CCNC: 15 U/L (ref 10–44)
ANION GAP SERPL CALC-SCNC: 5 MMOL/L (ref 8–16)
AST SERPL-CCNC: 20 U/L (ref 10–40)
BILIRUB SERPL-MCNC: 0.3 MG/DL (ref 0.1–1)
BUN SERPL-MCNC: 9 MG/DL (ref 6–20)
CALCIUM SERPL-MCNC: 9.2 MG/DL (ref 8.7–10.5)
CHLORIDE SERPL-SCNC: 106 MMOL/L (ref 95–110)
CO2 SERPL-SCNC: 28 MMOL/L (ref 23–29)
CREAT SERPL-MCNC: 0.7 MG/DL (ref 0.5–1.4)
EST. GFR  (NO RACE VARIABLE): >60 ML/MIN/1.73 M^2
GLUCOSE SERPL-MCNC: 86 MG/DL (ref 70–110)
POTASSIUM SERPL-SCNC: 4.2 MMOL/L (ref 3.5–5.1)
PROT SERPL-MCNC: 7.1 G/DL (ref 6–8.4)
SODIUM SERPL-SCNC: 139 MMOL/L (ref 136–145)

## 2022-11-28 PROCEDURE — 36415 COLL VENOUS BLD VENIPUNCTURE: CPT | Mod: PN | Performed by: STUDENT IN AN ORGANIZED HEALTH CARE EDUCATION/TRAINING PROGRAM

## 2022-11-28 PROCEDURE — 80053 COMPREHEN METABOLIC PANEL: CPT | Performed by: STUDENT IN AN ORGANIZED HEALTH CARE EDUCATION/TRAINING PROGRAM

## 2022-11-29 ENCOUNTER — CLINICAL SUPPORT (OUTPATIENT)
Dept: REHABILITATION | Facility: OTHER | Age: 62
End: 2022-11-29
Attending: OBSTETRICS & GYNECOLOGY
Payer: MEDICAID

## 2022-11-29 DIAGNOSIS — M79.18 MYOFASCIAL PAIN: ICD-10-CM

## 2022-11-29 DIAGNOSIS — R39.13 INTERMITTENT URINARY STREAM: ICD-10-CM

## 2022-11-29 DIAGNOSIS — L90.5 SCAR TISSUE: ICD-10-CM

## 2022-11-29 DIAGNOSIS — R27.9 LACK OF COORDINATION: ICD-10-CM

## 2022-11-29 PROCEDURE — 97161 PT EVAL LOW COMPLEX 20 MIN: CPT

## 2022-11-29 NOTE — PLAN OF CARE
[facility]  Pelvic Health Physical Therapy Initial Evaluation    Date: 2022   Name: Linda Kahn  [facility] Number: [id number]  Therapy Diagnosis:   Encounter Diagnoses   Name Primary?    Intermittent urinary stream     Myofascial pain      Physician: Ericka Luevano MD    Physician Orders: PT Eval and Treat  Medical Diagnosis from Referral:   R39.13 (ICD-10-CM) - Intermittent urinary stream   M79.18 (ICD-10-CM) - Myofascial pain   Evaluation Date: 2022  Authorization Period Expiration: 1 visit  Plan of Care Expiration: 23  Visit # / Visits authorized:     Time In: 100  Time Out: 155  Total Appointment Time (timed & untimed codes): 55 minutes    Precautions: universal    Subjective     Date of onset: couple years    History of current condition - Linda reports: Has back pain which altered her gait pattern and she injured the ankle and tore her ligament. Ankle is making back and hip pain worse. Swelling comes and goes. Has chronic back and neck pain. Currently in PT for back and neck.   Bladder issues started a few years ago, but had too much going on with her back so wasn't a good time to deal with it. Incomplete emptying, only comes out part way, has to push to get last part out. Also sits longer until more comes out. PV-dribble. Had used estrogen before and wanted to restart estrogen for atrophy with dryness. Has noticed this for past 10 years. Has a lot of medical stuff happening this past year. Had cyclospora and lost a lot of weight.     OB/GYN History:   Birth Control: menopause age 45  Surgical: tubes and L ovary removed due to PIC  History of chronic yeast, BV or UTIs? No  Sexually active? No, not interested in future activity  [place]: very painful in the past, after PID would have sex and end up in the hospital  Speculum exam: significantly painful to point cannot be completed    Bladder/Bowel History:   Frequency of urination:   Daytime: hourly            Nighttime: 1  Difficulty initiating urine stream: No  Urine stream: weak  Complete emptying: No  Push to empty bladder: Yes  Sit to urinate in public restrooms: Yes  Bladder leakage: Yes  Frequency of incidents/Type of incontinence: post-void only  Amount leaked (urine): few drops  Urinary Urgency: No  Frequency of bowel movements: once every 1-2 days  Difficulty initiating BM: Yes  Quality/Shape of BM: [place] Stool Chart 5-6  Does Patient Feel Empty after BM? Yes  Fiber Supplements or Laxative Use? No  Colon leakage: Yes  Frequency of incidents: rare   Fecal Urgency: Yes  Form of protection: none  Number of pads required in 24 hours: none  History of coccyx injury: Yes    Prolapse Screening:  Feeling of vaginal bulge: No    Pain:  Location: chronic back, neck and ankle pain     Medical History: Linda  has a past medical history of DDD (degenerative disc disease), cervical, Hemangioma of liver, History of thyroid nodule, Nicotine dependence, and Osteoporosis.     Surgical History: Linda Kahn  has a past surgical history that includes Oophorectomy (Left) and salpingectomy (Bilateral).    Medications: Linda has a current medication list which includes the following prescription(s): alprazolam, chlorzoxazone, citalopram, boostrix tdap, estradiol, fluticasone propionate, gabapentin, pneumoc 20-eliz conj-dip cr(pf), and polyethylene glycol.    Allergies:   Review of patient's allergies indicates:   Allergen Reactions    Penicillins Itching and Nausea Only    Cephalexin Hives, Itching, Rash and Swelling    Meperidine Itching and Rash      Prior Therapy/Previous treatment included: ortho PT  Social History: lives alone  Current exercise: PT and walking  Occupation: not working    Types of fluid intake: water - 3 glasses, juice - cranberry, apple, 1/2 caff coffee - 3 cups, 1 cup caffeine free tea at night,  smoothies  Diet: TBA     Abuse/Neglect: No     Pts goals: to get stronger and relax the pelvic floor to be  able to empty the bladder more completely    OBJECTIVE     See EMR under MEDIA for written consent provided 11/29/2022  Chaperone: Declined    ORTHO SCREEN  Posture in sitting: slouched     ABDOMINAL WALL ASSESSMENT  Palpation: tender RLQ  Abdominal strength: poor  Scarring: transverse incision, restricted, decreased mobility, painful on R aspect  Pelvic Floor Muscle and Transverse Abdominus Synergy: absent  Diastasis: absent      BREATHING MECHANICS ASSESSMENT   Thorax Assessment During Quiet Respiration: WNL excursion of ribcage and WNL excursion of abdominal wall  Thorax Assessment During Deep Respiration: WNL excursion of ribcage and WNL excursion of abdominal wall    VAGINAL PELVIC FLOOR EXAM    EXTERNAL ASSESSMENT  Introitus: stenotic  Skin condition: atrophic  Scarring: none   Sensation: WNL   Pain: none  Voluntary contraction: visible lift and accessory muscle use  Voluntary relaxation: visible drop  Involuntary contraction: visible lift  Bearing down: visible drop  Perineal descent: absent  Comments: na      INTERNAL ASSESSMENT  Pain: tender areas noted as follows: mild tenderness palpation L side pelvic floor mm, burning with palpation sabine-vesicular fascia   Sensation: able to localized pressure appropriately   Vaginal vault: WNL   Muscle Bulk: hypertonus   Muscle Power: 2+/5  Muscle Endurance: 10 sec  # Reps To Fatigue: 1    Fast Contractions in 10 seconds: 5     Quality of contraction: slow relaxation and incomplete relaxation   Specificity: WNL   Coordination: tends to hold breath during PFM contration   Prolapse check: none  Comments: na      TREATMENT     Patient Education provided:   general anatomy/physiology of urinary/ bowel  system and benefits of treatment were discussed with the pt. Additionally, anatomy/physiology of pelvic floor and diaphragmatic breathing were reviewed.     Home Exercises provided:  Written Home Exercises provided: Yes  Exercises were reviewed and Linda was able to  demonstrate them prior to the end of the session.    Linda demonstrated good  understanding of the education provided.     See EMR under Patient Instructions for exercises provided 11/29/2022.    Assessment     Linda is a 62 y.o. female referred to outpatient Physical Therapy with a medical diagnosis of intermittent urinary stream, myofascial pain. Pt presents with altered posture, poor knowledge of body mechanics and posture, adhered abdominal scar, poor trunk stability, pelvic floor tenderness, increased tension of the pelvic muscles, poor fluid intake, and unable to co-contract or co-relax abdominal wall and pelvic floor muscles. Scar tissue restrictions present at abdominal scar. Increased tone in pelvic floor mm. Downtraining initiated. No tissue laxity noted and no PVR visualized when checked with RUSI immediately post-void. She will also benefit from improved hip and spine mobility as well as increasing lumbopelvic stability with proximal hip girdle and core strengthening.     Pt prognosis is Excellent  Pt will benefit from skilled outpatient Physical Therapy to address the deficits stated above and in the chart below, provide pt/family education, and to maximize pt's level of independence.     Plan of care discussed with patient: Yes  Pt's spiritual, cultural and educational needs considered and patient is agreeable to the plan of care and goals as stated below:     Anticipated Barriers for therapy: None    Medical Necessity is demonstrated by the following:    History  Co-morbidities and personal factors that may impact the plan of care Co-morbidities   prior abdominal surgery    Personal Factors  no deficits     low   Examination  Body structures and functions, activity limitations and participation restrictions that may impact the plan of care Body Regions/Systems/Functions:  altered posture, poor knowledge of body mechanics and posture, adhered abdominal scar, poor trunk stability, pelvic floor tenderness,  increased tension of the pelvic muscles, poor fluid intake, and unable to co-contract or co-relax abdominal wall and pelvic floor muscles.    Activity Limitations:  delaying urge to urinate and intercourse/vaginal exam/tampon use without pain    Participation Restrictions:  all ADLs/iADLs uninterrupted by urinary incontinence/urgency/frequency and well woman's exam    Activity limitations:   Learning and applying knowledge  No deficits    General Tasks and Commands  No deficits    Communication  No deficits    Mobility  No deficits    Self care  No deficits    Domestic Life  No deficits    Interactions/Relationships  No deficits    Life Areas  No deficits    Community and Social Life  No deficits       high   Clinical Presentation evolving clinical presentation with changing clinical characteristics moderate   Decision Making/ Complexity Score: low       Goals:  Short Term Goals: 4 weeks   Pt indep in HEP  Pt able to wait at least 2 hours between trips to the bathroom for improved participation in ADLs  Pt demo pelvic floor mm strength at least 3+/5 for improved continence and support of pelvic organs    Long Term Goals: 8 weeks   Pt indep in progressive HEP  Pt reports 0 episodes of leakage in at least 2 weeks for improved ADL participation  Pt able to wait at least 2.5-4 hours between trips to the bathroom   Nocturia no more than 1x/night for improved quality of sleep  Pt reports able to tolerate speculum insertion without pain for ability to participate in medically necessary examinations  Pt demo pelvic floor mm strength at least 4/5 for improved continence and support of pelvic organs    Plan     Plan of care Certification: 11/29/2022 to 2/27/23.    Outpatient Physical Therapy 1 times weekly for 12 weeks to include the following interventions: therapeutic exercises, therapeutic activity, neuromuscular re-education, manual therapy, patient/family education and self care/home management    Leonor Law,  PT

## 2022-11-30 ENCOUNTER — PATIENT MESSAGE (OUTPATIENT)
Dept: PRIMARY CARE CLINIC | Facility: CLINIC | Age: 62
End: 2022-11-30
Payer: MEDICAID

## 2022-12-02 ENCOUNTER — OFFICE VISIT (OUTPATIENT)
Dept: UROGYNECOLOGY | Facility: CLINIC | Age: 62
End: 2022-12-02
Payer: MEDICAID

## 2022-12-02 VITALS
HEIGHT: 67 IN | BODY MASS INDEX: 22.15 KG/M2 | DIASTOLIC BLOOD PRESSURE: 78 MMHG | SYSTOLIC BLOOD PRESSURE: 112 MMHG | WEIGHT: 141.13 LBS

## 2022-12-02 DIAGNOSIS — R39.13 INTERMITTENT URINARY STREAM: ICD-10-CM

## 2022-12-02 DIAGNOSIS — R33.9 INCOMPLETE BLADDER EMPTYING: ICD-10-CM

## 2022-12-02 DIAGNOSIS — R35.0 URINARY FREQUENCY: ICD-10-CM

## 2022-12-02 DIAGNOSIS — R10.2 PELVIC PAIN: ICD-10-CM

## 2022-12-02 LAB
BILIRUB SERPL-MCNC: NORMAL MG/DL
BLOOD URINE, POC: NORMAL
CLARITY, POC UA: CLEAR
COLOR, POC UA: YELLOW
GLUCOSE UR QL STRIP: NORMAL
KETONES UR QL STRIP: NORMAL
LEUKOCYTE ESTERASE URINE, POC: NORMAL
NITRITE, POC UA: NORMAL
PH, POC UA: 5
PROTEIN, POC: NORMAL
SPECIFIC GRAVITY, POC UA: 1
UROBILINOGEN, POC UA: NORMAL

## 2022-12-02 PROCEDURE — 99213 OFFICE O/P EST LOW 20 MIN: CPT | Mod: PBBFAC | Performed by: OBSTETRICS & GYNECOLOGY

## 2022-12-02 PROCEDURE — 3044F HG A1C LEVEL LT 7.0%: CPT | Mod: CPTII,,, | Performed by: OBSTETRICS & GYNECOLOGY

## 2022-12-02 PROCEDURE — 3008F BODY MASS INDEX DOCD: CPT | Mod: CPTII,,, | Performed by: OBSTETRICS & GYNECOLOGY

## 2022-12-02 PROCEDURE — 99999 PR PBB SHADOW E&M-EST. PATIENT-LVL III: ICD-10-PCS | Mod: PBBFAC,,, | Performed by: OBSTETRICS & GYNECOLOGY

## 2022-12-02 PROCEDURE — 99499 UNLISTED E&M SERVICE: CPT | Mod: S$PBB,,, | Performed by: OBSTETRICS & GYNECOLOGY

## 2022-12-02 PROCEDURE — 3078F DIAST BP <80 MM HG: CPT | Mod: CPTII,,, | Performed by: OBSTETRICS & GYNECOLOGY

## 2022-12-02 PROCEDURE — 1159F PR MEDICATION LIST DOCUMENTED IN MEDICAL RECORD: ICD-10-PCS | Mod: CPTII,,, | Performed by: OBSTETRICS & GYNECOLOGY

## 2022-12-02 PROCEDURE — 1159F MED LIST DOCD IN RCRD: CPT | Mod: CPTII,,, | Performed by: OBSTETRICS & GYNECOLOGY

## 2022-12-02 PROCEDURE — 99499 NO LOS: ICD-10-PCS | Mod: S$PBB,,, | Performed by: OBSTETRICS & GYNECOLOGY

## 2022-12-02 PROCEDURE — 52000 CYSTOURETHROSCOPY: CPT | Mod: PBBFAC | Performed by: OBSTETRICS & GYNECOLOGY

## 2022-12-02 PROCEDURE — 3008F PR BODY MASS INDEX (BMI) DOCUMENTED: ICD-10-PCS | Mod: CPTII,,, | Performed by: OBSTETRICS & GYNECOLOGY

## 2022-12-02 PROCEDURE — 99999 PR PBB SHADOW E&M-EST. PATIENT-LVL III: CPT | Mod: PBBFAC,,, | Performed by: OBSTETRICS & GYNECOLOGY

## 2022-12-02 PROCEDURE — 52000 CYSTOSCOPY: ICD-10-PCS | Mod: S$PBB,,, | Performed by: OBSTETRICS & GYNECOLOGY

## 2022-12-02 PROCEDURE — 3078F PR MOST RECENT DIASTOLIC BLOOD PRESSURE < 80 MM HG: ICD-10-PCS | Mod: CPTII,,, | Performed by: OBSTETRICS & GYNECOLOGY

## 2022-12-02 PROCEDURE — 3074F PR MOST RECENT SYSTOLIC BLOOD PRESSURE < 130 MM HG: ICD-10-PCS | Mod: CPTII,,, | Performed by: OBSTETRICS & GYNECOLOGY

## 2022-12-02 PROCEDURE — 3044F PR MOST RECENT HEMOGLOBIN A1C LEVEL <7.0%: ICD-10-PCS | Mod: CPTII,,, | Performed by: OBSTETRICS & GYNECOLOGY

## 2022-12-02 PROCEDURE — 81002 URINALYSIS NONAUTO W/O SCOPE: CPT | Mod: PBBFAC | Performed by: OBSTETRICS & GYNECOLOGY

## 2022-12-02 PROCEDURE — 3074F SYST BP LT 130 MM HG: CPT | Mod: CPTII,,, | Performed by: OBSTETRICS & GYNECOLOGY

## 2022-12-02 RX ORDER — SULFAMETHOXAZOLE AND TRIMETHOPRIM 800; 160 MG/1; MG/1
1 TABLET ORAL
Status: COMPLETED | OUTPATIENT
Start: 2022-12-02 | End: 2022-12-02

## 2022-12-02 RX ORDER — LIDOCAINE HYDROCHLORIDE 20 MG/ML
JELLY TOPICAL ONCE
Status: COMPLETED | OUTPATIENT
Start: 2022-12-02 | End: 2022-12-02

## 2022-12-02 RX ADMIN — SULFAMETHOXAZOLE AND TRIMETHOPRIM 1 TABLET: 800; 160 TABLET ORAL at 10:12

## 2022-12-02 RX ADMIN — LIDOCAINE HYDROCHLORIDE 5 ML: 20 JELLY TOPICAL at 10:12

## 2022-12-02 NOTE — PROCEDURES
Cystoscopy    Date/Time: 12/2/2022 10:00 AM  Performed by: Ericka Luevano MD  Authorized by: Ericka Luevano MD     Consent Done?:  Yes (Written)  Prep: patient was prepped and draped in usual sterile fashion    Local anesthesia used?: Yes    Anesthesia:  Lidocaine jelly  Local anesthetic:  Topical anesthetic  Indications comment:  Bladder pain & Interrupted Urinary Stream  Position:  Supine  Anesthesia:  Lidocaine jelly  Patient sedated?: No    Preparation: Patient was prepped and draped in usual sterile fashion    Scope type:  Flexible cystoscope  Stent inserted: No    Stent removed: No    External exam normal: Yes    Digital exam performed: No    Urethra normal: Yes    Bladder neck normal: Yes    Bladder normal: Yes     patient tolerated the procedure well with no immediate complications    Procedure:  Cystourethroscopy was performed. The urethra was prepped with betadine, and 10 mL of 2% lidocaine gel were instilled into the bladder through the urethra.  A flexible cystourethroscope was introduced into the bladder. The bladder was distended with approximately 300 cubic centimeters of sterile water. A systematic survey was performed in which the bladder was surveyed using multiple sequential passes in a clockwise fashion from the bladder dome to the bladder base to the urethrovesical junction. The trigone and ureteral orifices were observed. The scope was then flipped back on itself, and the urethrovesical junction was viewed. With the water on the cystoscope was slowly withdraw to view the urethra. The telescope was then completely withdrawn.     Findings:      Meatus:  Normal  Urethra:  Normal  Ureteral orifices:  Normal in appearance with clear ureteral efflux noted bilaterally    Bladder Diverticulum:  None  Bladder Stone(s):  None  Bladder Tumor:  None  Bladder Mucosa: Normal in appearance  Bladder Wall:  Moderate trabeculations    Antibiotic prophylaxis was given.      Assessment: Essentially Normal  cystourethroscopy with findings of trabeculations c/w OAB symptoms.     Urinary frequency  -     Cystoscopy  -     POCT URINE DIPSTICK WITHOUT MICROSCOPE  -     LIDOcaine HCl 2% urojet  -     sulfamethoxazole-trimethoprim 800-160mg per tablet 1 tablet    Pelvic pain  -     Cystoscopy  -     POCT URINE DIPSTICK WITHOUT MICROSCOPE  -     LIDOcaine HCl 2% urojet  -     sulfamethoxazole-trimethoprim 800-160mg per tablet 1 tablet    Intermittent urinary stream  -     Cystoscopy  -     POCT URINE DIPSTICK WITHOUT MICROSCOPE  -     LIDOcaine HCl 2% urojet  -     sulfamethoxazole-trimethoprim 800-160mg per tablet 1 tablet    Incomplete bladder emptying  -     Cystoscopy  -     POCT URINE DIPSTICK WITHOUT MICROSCOPE  -     LIDOcaine HCl 2% urojet  -     sulfamethoxazole-trimethoprim 800-160mg per tablet 1 tablet        Plan:   61 yo with urinary frequency, abnormal urinary stream, and pelvic pain presented today for a cystoscopy. No abnormalities are noted on today's exam. Reviewed that she should continue with the pelvic floor PT at this time as she has only had one visit so far. We will re-assess after a few more visits and additional options will be considered if there is no improvement in symptoms.     All questions were answered today. Patient aware to contact the office with any questions or concerns.     Ericka Luevano MD

## 2023-01-05 ENCOUNTER — TELEPHONE (OUTPATIENT)
Dept: ENDOSCOPY | Facility: HOSPITAL | Age: 63
End: 2023-01-05
Payer: MEDICAID

## 2023-01-05 ENCOUNTER — PATIENT MESSAGE (OUTPATIENT)
Dept: ENDOSCOPY | Facility: HOSPITAL | Age: 63
End: 2023-01-05
Payer: MEDICAID

## 2023-01-05 NOTE — TELEPHONE ENCOUNTER
Received inbasket message.     Called patient. No answer. Left message for patient to call Endoscopy Scheduling. Main Endo phone number provided.      [facility] Endo Schedulers  Caller: Pt (Today,  9:17 AM)  Pt is requesting a callback in regards to rescheduling colonoscopy for 1/9. Pt would like to schedule appt. In March. Please adv.         Confirmed contact below:   Contact Name:Linda Kahn   Phone Number: [phone]

## 2023-01-05 NOTE — TELEPHONE ENCOUNTER
Called patient to reschedule colonoscopy. No answer. Left message for patient to call Endoscopy Scheduling. Main Endo phone number provided.

## 2023-02-16 DIAGNOSIS — F41.9 ANXIETY AND DEPRESSION: ICD-10-CM

## 2023-02-16 DIAGNOSIS — F43.29 ADJUSTMENT DISORDER WITH MIXED EMOTIONAL FEATURES: ICD-10-CM

## 2023-02-16 DIAGNOSIS — F32.A ANXIETY AND DEPRESSION: ICD-10-CM

## 2023-02-17 RX ORDER — ESTRADIOL 0.1 MG/G
1 CREAM VAGINAL DAILY
Qty: 42.5 G | Refills: 0 | Status: SHIPPED | OUTPATIENT
Start: 2023-02-17 | End: 2023-08-16

## 2023-02-17 RX ORDER — CITALOPRAM 20 MG/1
20 TABLET, FILM COATED ORAL DAILY
Qty: 90 TABLET | Refills: 0 | Status: SHIPPED | OUTPATIENT
Start: 2023-02-17 | End: 2023-05-25

## 2023-03-27 ENCOUNTER — HOSPITAL ENCOUNTER (OUTPATIENT)
Dept: RADIOLOGY | Facility: OTHER | Age: 63
Discharge: HOME | End: 2023-03-27
Attending: STUDENT IN AN ORGANIZED HEALTH CARE EDUCATION/TRAINING PROGRAM
Payer: MEDICAID

## 2023-03-27 DIAGNOSIS — M81.6 LOCALIZED OSTEOPOROSIS WITHOUT CURRENT PATHOLOGICAL FRACTURE: ICD-10-CM

## 2023-03-27 DIAGNOSIS — Z12.31 ENCOUNTER FOR SCREENING MAMMOGRAM FOR MALIGNANT NEOPLASM OF BREAST: ICD-10-CM

## 2023-03-27 PROCEDURE — 77080 DXA BONE DENSITY AXIAL: CPT | Mod: TC

## 2023-03-27 PROCEDURE — 77067 SCR MAMMO BI INCL CAD: CPT | Mod: TC

## 2023-03-27 PROCEDURE — 77063 BREAST TOMOSYNTHESIS BI: CPT | Mod: 26,,, | Performed by: RADIOLOGY

## 2023-03-27 PROCEDURE — 77067 MAMMO DIGITAL SCREENING BILAT WITH TOMO: ICD-10-PCS | Mod: 26,,, | Performed by: RADIOLOGY

## 2023-03-27 PROCEDURE — 77063 MAMMO DIGITAL SCREENING BILAT WITH TOMO: ICD-10-PCS | Mod: 26,,, | Performed by: RADIOLOGY

## 2023-03-27 PROCEDURE — 77080 DEXA BONE DENSITY SPINE HIP: ICD-10-PCS | Mod: 26,,, | Performed by: INTERNAL MEDICINE

## 2023-03-27 PROCEDURE — 77067 SCR MAMMO BI INCL CAD: CPT | Mod: 26,,, | Performed by: RADIOLOGY

## 2023-03-27 PROCEDURE — 77080 DXA BONE DENSITY AXIAL: CPT | Mod: 26,,, | Performed by: INTERNAL MEDICINE

## 2023-04-04 NOTE — PROGRESS NOTES
Bi-rads-1. Yearly follow-up advised. TC risk <20%.     Your mammogram was read as normal. We advise yearly repeat. Let us know if you notice any suspicious lumps, skin changes, or other breast/armpit abnormalities in the mean time.     Let us know if you have any questions or concerns about these results.

## 2023-04-21 ENCOUNTER — PATIENT MESSAGE (OUTPATIENT)
Dept: ADMINISTRATIVE | Facility: HOSPITAL | Age: 63
End: 2023-04-21
Payer: MEDICAID

## 2023-05-24 DIAGNOSIS — F41.9 ANXIETY AND DEPRESSION: ICD-10-CM

## 2023-05-24 DIAGNOSIS — F43.29 ADJUSTMENT DISORDER WITH MIXED EMOTIONAL FEATURES: ICD-10-CM

## 2023-05-24 DIAGNOSIS — F32.A ANXIETY AND DEPRESSION: ICD-10-CM

## 2023-05-25 RX ORDER — CITALOPRAM 20 MG/1
TABLET, FILM COATED ORAL
Qty: 90 TABLET | Refills: 3 | Status: SHIPPED | OUTPATIENT
Start: 2023-05-25 | End: 2023-06-12

## 2023-06-01 ENCOUNTER — NURSE TRIAGE (OUTPATIENT)
Dept: ADMINISTRATIVE | Facility: CLINIC | Age: 63
End: 2023-06-01
Payer: MEDICAID

## 2023-06-01 NOTE — TELEPHONE ENCOUNTER
Patient advised that she has been experiencing loose light colored stool for the past few days. Denies any other symptoms. Pt did note that this occurred after eating several almonds the other day.     Please advise.

## 2023-06-01 NOTE — TELEPHONE ENCOUNTER
Pt reports light stools for the past couple days. Advised, per protocol. She verbalizes understanding but would like follow up in this regard.     Reason for Disposition   [1] Stool is light gray or whitish AND [2] unexplained    Additional Information   Negative: Patient sounds very sick or weak to the triager    Protocols used: Stools - Unusual Color-A-AH

## 2023-06-06 ENCOUNTER — TELEPHONE (OUTPATIENT)
Dept: PRIMARY CARE CLINIC | Facility: CLINIC | Age: 63
End: 2023-06-06
Payer: MEDICAID

## 2023-06-06 NOTE — TELEPHONE ENCOUNTER
----- Message from Sussy Borrego sent at 6/6/2023  2:29 PM CDT -----  Contact: [phone]  No blue slot available to schedule an appointment for the patient.  Patient is established with which PCP: Veronica  Reason for the visit: F/U / medication/ medical discussion  Would the patient like a call back, or a response through their [facility] portal?:  call back

## 2023-06-06 NOTE — TELEPHONE ENCOUNTER
Patient states that her stools are starting to look better, she has been monitoring them.  She apologizes for missing the appointment last week on 6/2/23 with Dr. Guy, she didn't realize that [facility] had scheduled one.  Patient agrees to see Dr. Martin Monday 6/12/23 at 1:30 pm.

## 2023-06-12 ENCOUNTER — OFFICE VISIT (OUTPATIENT)
Dept: PRIMARY CARE CLINIC | Facility: CLINIC | Age: 63
End: 2023-06-12
Payer: MEDICAID

## 2023-06-12 ENCOUNTER — PATIENT MESSAGE (OUTPATIENT)
Dept: PRIMARY CARE CLINIC | Facility: CLINIC | Age: 63
End: 2023-06-12

## 2023-06-12 VITALS
HEART RATE: 73 BPM | OXYGEN SATURATION: 98 % | HEIGHT: 67 IN | DIASTOLIC BLOOD PRESSURE: 62 MMHG | BODY MASS INDEX: 21.66 KG/M2 | WEIGHT: 138 LBS | SYSTOLIC BLOOD PRESSURE: 108 MMHG

## 2023-06-12 DIAGNOSIS — K76.9 LIVER LESION, RIGHT LOBE: ICD-10-CM

## 2023-06-12 DIAGNOSIS — F41.9 ANXIETY AND DEPRESSION: ICD-10-CM

## 2023-06-12 DIAGNOSIS — D18.03 HEPATIC HEMANGIOMA: ICD-10-CM

## 2023-06-12 DIAGNOSIS — A07.4 INFECTION OF INTESTINE DUE TO CYCLOSPORA CAYETANENSIS: ICD-10-CM

## 2023-06-12 DIAGNOSIS — D22.9 NUMEROUS MOLES: ICD-10-CM

## 2023-06-12 DIAGNOSIS — R19.5 CLAY-COLORED STOOLS: Primary | ICD-10-CM

## 2023-06-12 DIAGNOSIS — Z12.11 ENCOUNTER FOR COLORECTAL CANCER SCREENING: ICD-10-CM

## 2023-06-12 DIAGNOSIS — Z12.12 ENCOUNTER FOR COLORECTAL CANCER SCREENING: ICD-10-CM

## 2023-06-12 DIAGNOSIS — F32.A ANXIETY AND DEPRESSION: ICD-10-CM

## 2023-06-12 PROCEDURE — 1159F MED LIST DOCD IN RCRD: CPT | Mod: CPTII,,, | Performed by: STUDENT IN AN ORGANIZED HEALTH CARE EDUCATION/TRAINING PROGRAM

## 2023-06-12 PROCEDURE — 1159F PR MEDICATION LIST DOCUMENTED IN MEDICAL RECORD: ICD-10-PCS | Mod: CPTII,,, | Performed by: STUDENT IN AN ORGANIZED HEALTH CARE EDUCATION/TRAINING PROGRAM

## 2023-06-12 PROCEDURE — 99999 PR PBB SHADOW E&M-EST. PATIENT-LVL V: CPT | Mod: PBBFAC,,, | Performed by: STUDENT IN AN ORGANIZED HEALTH CARE EDUCATION/TRAINING PROGRAM

## 2023-06-12 PROCEDURE — 99999 PR PBB SHADOW E&M-EST. PATIENT-LVL V: ICD-10-PCS | Mod: PBBFAC,,, | Performed by: STUDENT IN AN ORGANIZED HEALTH CARE EDUCATION/TRAINING PROGRAM

## 2023-06-12 PROCEDURE — 99215 OFFICE O/P EST HI 40 MIN: CPT | Mod: PBBFAC,PN | Performed by: STUDENT IN AN ORGANIZED HEALTH CARE EDUCATION/TRAINING PROGRAM

## 2023-06-12 PROCEDURE — 99215 OFFICE O/P EST HI 40 MIN: CPT | Mod: S$PBB,,, | Performed by: STUDENT IN AN ORGANIZED HEALTH CARE EDUCATION/TRAINING PROGRAM

## 2023-06-12 PROCEDURE — 3008F BODY MASS INDEX DOCD: CPT | Mod: CPTII,,, | Performed by: STUDENT IN AN ORGANIZED HEALTH CARE EDUCATION/TRAINING PROGRAM

## 2023-06-12 PROCEDURE — 3078F DIAST BP <80 MM HG: CPT | Mod: CPTII,,, | Performed by: STUDENT IN AN ORGANIZED HEALTH CARE EDUCATION/TRAINING PROGRAM

## 2023-06-12 PROCEDURE — 3078F PR MOST RECENT DIASTOLIC BLOOD PRESSURE < 80 MM HG: ICD-10-PCS | Mod: CPTII,,, | Performed by: STUDENT IN AN ORGANIZED HEALTH CARE EDUCATION/TRAINING PROGRAM

## 2023-06-12 PROCEDURE — 3074F SYST BP LT 130 MM HG: CPT | Mod: CPTII,,, | Performed by: STUDENT IN AN ORGANIZED HEALTH CARE EDUCATION/TRAINING PROGRAM

## 2023-06-12 PROCEDURE — 3074F PR MOST RECENT SYSTOLIC BLOOD PRESSURE < 130 MM HG: ICD-10-PCS | Mod: CPTII,,, | Performed by: STUDENT IN AN ORGANIZED HEALTH CARE EDUCATION/TRAINING PROGRAM

## 2023-06-12 PROCEDURE — 3008F PR BODY MASS INDEX (BMI) DOCUMENTED: ICD-10-PCS | Mod: CPTII,,, | Performed by: STUDENT IN AN ORGANIZED HEALTH CARE EDUCATION/TRAINING PROGRAM

## 2023-06-12 PROCEDURE — 99215 PR OFFICE/OUTPT VISIT, EST, LEVL V, 40-54 MIN: ICD-10-PCS | Mod: S$PBB,,, | Performed by: STUDENT IN AN ORGANIZED HEALTH CARE EDUCATION/TRAINING PROGRAM

## 2023-06-12 RX ORDER — HYDROXYZINE PAMOATE 25 MG/1
25 CAPSULE ORAL NIGHTLY PRN
Qty: 30 CAPSULE | Refills: 0 | Status: SHIPPED | OUTPATIENT
Start: 2023-06-12

## 2023-06-12 RX ORDER — ALPRAZOLAM 0.5 MG/1
0.5 TABLET ORAL DAILY PRN
Qty: 15 TABLET | Refills: 0 | Status: SHIPPED | OUTPATIENT
Start: 2023-06-12

## 2023-06-12 RX ORDER — CITALOPRAM 40 MG/1
40 TABLET, FILM COATED ORAL DAILY
Qty: 30 TABLET | Refills: 0 | Status: SHIPPED | OUTPATIENT
Start: 2023-06-12 | End: 2023-07-25 | Stop reason: SDUPTHER

## 2023-06-12 NOTE — PROGRESS NOTES
Linda Kahn  1960        Subjective     Chief Complaint: Carmen colored stools    History of Present Illness:  Ms. Linda Kahn is a 62 y.o. female who presents to clinic for change in stool color.     Occurred 3 days last week, was tan/carmen/beige colored, after eating almonds, now cleared up. Was eating bags of almonds. Was more than usual. Now back to dark brown.  Mild gas feeling at that time, now improved. No vomiting. No fever. No current diarrhea.  Has a history liver hemangioma.    Not gaining weight, fees ever since she had food poisoning last year (cryptosporidia from deli meat, diarrhea for 5 weeks). That has since resolved. Due for colonoscopy. Has not had one before, did FIT test.  Was scheduled for colonoscopy but had a lot going on with Orthopedics so canceled.    Wt Readings from Last 5 Encounters:   06/12/23 62.6 kg (138 lb 0.1 oz)   12/02/22 64 kg (141 lb 1.5 oz)   11/21/22 63.5 kg (140 lb)   11/01/22 64 kg (141 lb)   10/17/22 63.1 kg (139 lb 1.8 oz)     Back and neck pain. Had RFA in neck. Seeing outside [facility]. Planning to have ankle surgery.  Has labs, EKG, chest x-ray ordered per [facility] already.    Anxiety and Depression. Wants to go to Celexa 40 mg, currently on 20 mg. Understands this is above recommended dose. No current SI or HI. Discussed ED precautions if those feelings occur.  Willing to establish with Psychiatry.  On p.r.n. Xanax.  Discussed addictive nature of this medication.  She would also be open to trying different medication for anxiety.    Would like to see Dermatology, has multiple moles.  Reports history of skin cancer on her back many years ago.  Does not remember if melanoma or basal cell or squamous cell.    Review of Systems   Constitutional:  Negative for chills and fever.   Cardiovascular:  Negative for leg swelling.   Gastrointestinal:  Negative for abdominal pain, blood in stool and diarrhea.   Musculoskeletal:  Positive for back pain, joint pain and neck  pain.   Skin:         moles   Neurological:  Negative for speech change.   Psychiatric/Behavioral:  The patient is nervous/anxious.       PAST HISTORY:     Past Medical History:   Diagnosis Date    DDD (degenerative disc disease), cervical     Hemangioma of liver     History of thyroid nodule     Nicotine dependence     Osteoporosis        Past Surgical History:   Procedure Laterality Date    OOPHORECTOMY Left     salpingectomy Bilateral        Family History   Problem Relation Age of Onset    Alcohol abuse Mother     Coronary artery disease Father     Hypertension Father     No Known Problems Sister     No Known Problems Sister     No Known Problems Brother     No Known Problems Brother     No Known Problems Brother     No Known Problems Brother        Social History     Socioeconomic History    Marital status: Single   Tobacco Use    Smoking status: Every Day     Types: Vaping with nicotine    Smokeless tobacco: Never   Substance and Sexual Activity    Alcohol use: Yes     Comment: Occasional now 1 x per week, previosuly 7 per week    Drug use: Not Currently     Types: Cocaine, Marijuana, Methamphetamines, Barbituates, Hashish, LSD, MDMA (Ecstacy), PCP, Psilocybin     Comment: occasional marijuana    Sexual activity: Not Currently     Partners: Male   Social History Narrative    Lives with two cats. Feels safe in her home.        MEDICATIONS & ALLERGIES:     Current Outpatient Medications on File Prior to Visit   Medication Sig    ALPRAZolam (XANAX) 0.5 MG tablet Take 1 tablet (0.5 mg total) by mouth daily as needed for Anxiety.    chlorzoxazone (PARAFON FORTE) 500 mg Tab Take 500 mg by mouth 4 (four) times daily as needed.    estradioL (ESTRACE) 0.01 % (0.1 mg/gram) vaginal cream Place 1 g vaginally once daily.    fluticasone propionate (FLONASE) 50 mcg/actuation nasal spray instill 1 spray (50 mcg total) by Each Nostril route once daily.    gabapentin (NEURONTIN) 300 MG capsule Take 300 mg by mouth every  "evening.    pneumoc 20-eliz conj-dip cr,PF, (PREVNAR-20, PF,) 0.5 mL Syrg injection Inject 0.5 mLs into the muscle.    [DISCONTINUED] citalopram (CELEXA) 20 MG tablet TAKE 1 TABLET(20 MG) BY MOUTH EVERY DAY    diphth,pertus,acell,,tetanus (BOOSTRIX TDAP) 2.5-8-5 Lf-mcg-Lf/0.5mL Syrg injection Inject into the muscle. (Patient not taking: Reported on 6/12/2023)     No current facility-administered medications on file prior to visit.       Review of patient's allergies indicates:   Allergen Reactions    Penicillins Itching and Nausea Only    Cephalexin Hives, Itching, Rash and Swelling    Meperidine Itching and Rash       OBJECTIVE:     Vital Signs:  Vitals:    06/12/23 1341   BP: 108/62   BP Location: Left arm   Patient Position: Sitting   BP Method: Medium (Manual)   Pulse: 73   SpO2: 98%   Weight: 62.6 kg (138 lb 0.1 oz)   Height: 5' 7" (1.702 m)       Body mass index is 21.62 kg/m².     Physical Exam:  Physical Exam  Vitals and nursing note reviewed.   Constitutional:       General: She is not in acute distress.     Appearance: Normal appearance. She is not ill-appearing, toxic-appearing or diaphoretic.   HENT:      Head: Normocephalic and atraumatic.   Eyes:      General: No scleral icterus.  Pulmonary:      Effort: Pulmonary effort is normal. No respiratory distress.      Breath sounds: Normal breath sounds. No wheezing or rales.   Abdominal:      General: There is no distension.   Musculoskeletal:      Right lower leg: No edema.      Left lower leg: No edema.   Skin:     General: Skin is warm and dry.             Comments: Few moles noted.    Neurological:      Mental Status: She is alert and oriented to person, place, and time.   Psychiatric:         Mood and Affect: Affect normal.         Speech: Speech normal.         Behavior: Behavior is cooperative.         Thought Content: Thought content does not include homicidal or suicidal ideation.         Cognition and Memory: Cognition and memory normal.    "       Laboratory  Lab Results   Component Value Date    WBC 4.61 10/17/2022    HGB 13.8 10/17/2022    HCT 41.0 10/17/2022    MCV 89 10/17/2022     10/17/2022     Lab Results   Component Value Date    GLU 86 11/28/2022     11/28/2022    K 4.2 11/28/2022     11/28/2022    CO2 28 11/28/2022    BUN 9 11/28/2022    CREATININE 0.7 11/28/2022    CALCIUM 9.2 11/28/2022     No results found for: INR, PROTIME  Lab Results   Component Value Date    HGBA1C 5.1 10/17/2022           Health Maintenance         Date Due Completion Date    Colorectal Cancer Screening 02/19/2022 2/19/2021    HIV Screening 10/01/2026 (Originally 6/14/1975) ---    Mammogram 03/27/2024 3/27/2023    DEXA Scan 03/27/2025 3/27/2023    Cervical Cancer Screening 10/20/2025 10/20/2020    Lipid Panel 10/17/2027 10/17/2022    TETANUS VACCINE 10/17/2032 10/17/2022              ASSESSMENT & PLAN:   Ms. Linda Kahn is a 62 y.o. female who was seen today in clinic for multiple concerns.  Last week had carmen-colored stools without any other symptoms.  Resolved on its own, now back to [place].  Not losing weight, but not gaining weight ever since she had food poisoning last year.  Discussed pancreatic concerns regarding carmen-colored stools.  We will check CMP, if having done at [facility] for preop for her ankle can use that so she does not have to have done twice.  Can send results.  Has a liver hemangioma, will repeat ultrasound and sent to hepatology.     Denies current SI or HI but does report increased anxiety and depression.  Asking to increase her dose of Celexa to 40 mg.  Understands this is higher than the advise dose.  Will refer to Psychiatry.  Understands she needs to call to make appointment.  Asking for refill of Xanax, reports using very sparingly.  Explained chronic benzos usually not handled by primary care however can refill until she can get into see Psychiatry for further refills. Open to trying Vistaril instead as needed.  Understands risks of sedation with this medication. Patient was advised to call 911 urgently or go to the nearest emergency room if symptoms worsen or if she feels suicidal or homicidal.    Due for colonoscopy.  Discussed etiology of screening vs diagnostic colonoscopy.  No longer having diarrhea, no blood in the stools, and stool color back to normal so will order screening colonoscopy.      1. Vahe-colored stools  -     COMPREHENSIVE METABOLIC PANEL; Future; Expected date: 06/12/2023  -     US Abdomen Complete; Future; Expected date: 06/12/2023    2. Hepatic hemangioma  -     COMPREHENSIVE METABOLIC PANEL; Future; Expected date: 06/12/2023  -     US Abdomen Complete; Future; Expected date: 06/12/2023  -     Hepatology Referral     3. Encounter for colorectal cancer screening  -     Ambulatory referral/consult to Endo Procedure ; Future; Expected date: 06/13/2023    4. Anxiety and depression  -     citalopram (CELEXA) 40 MG tablet; Take 1 tablet (40 mg total) by mouth once daily.  Dispense: 30 tablet; Refill: 0  -     hydrOXYzine pamoate (VISTARIL) 25 MG Cap; Take 1 capsule (25 mg total) by mouth nightly as needed (anxiety).  Dispense: 30 capsule; Refill: 0  -     Ambulatory referral/consult to Psychiatry; Future; Expected date: 06/19/2023    5. Numerous moles  -     Ambulatory referral/consult to Dermatology; Future; Expected date: 06/19/2023         Time spent in the evaluation and management of this patient exceeded 50 min and greater than 50% of this time was in face-to-face time with the patient on day of the clinic visit.   This includes face-to-face time and non face-to-face time and includes the following:  --preparing to see the patient (eg, obtaining and/or reviewing old records such as, when applicable, primary care notes, specialist notes, hospital notes, review of laboratory tests, and/or radiographic and/or cardiology or other studies)  --performing a medically appropriate review of systems  and examination and/or evaluation  --reviewing and independently interpreting results (not separately reported; eg, lab results) and communicating results to the patient and/or family/caregiver  --placing orders and/or reviewing other physician's orders which can both include medications, laboratory studies, radiographic studies, procedures, referrals etcetera and   --counseling and educating the patient and/or family member/caregiver regarding the treatment plan  --documentation of the visit in the electronic health record  --communicating with other health care providers regarding referrals, studies, follow-up, etc      Rosa Martin MD  Internal Medicine

## 2023-07-25 DIAGNOSIS — F32.A ANXIETY AND DEPRESSION: ICD-10-CM

## 2023-07-25 DIAGNOSIS — F41.9 ANXIETY AND DEPRESSION: ICD-10-CM

## 2023-07-26 ENCOUNTER — HOSPITAL ENCOUNTER (OUTPATIENT)
Dept: RADIOLOGY | Facility: OTHER | Age: 63
Discharge: HOME | End: 2023-07-26
Attending: STUDENT IN AN ORGANIZED HEALTH CARE EDUCATION/TRAINING PROGRAM
Payer: MEDICAID

## 2023-07-26 DIAGNOSIS — D18.03 HEPATIC HEMANGIOMA: ICD-10-CM

## 2023-07-26 DIAGNOSIS — R19.5 CLAY-COLORED STOOLS: ICD-10-CM

## 2023-07-26 PROCEDURE — 76700 US EXAM ABDOM COMPLETE: CPT | Mod: TC

## 2023-07-26 PROCEDURE — 76700 US EXAM ABDOM COMPLETE: CPT | Mod: 26,,, | Performed by: RADIOLOGY

## 2023-07-26 PROCEDURE — 76700 US ABDOMEN COMPLETE: ICD-10-PCS | Mod: 26,,, | Performed by: RADIOLOGY

## 2023-07-26 RX ORDER — CITALOPRAM 40 MG/1
40 TABLET, FILM COATED ORAL DAILY
Qty: 90 TABLET | Refills: 3 | Status: SHIPPED | OUTPATIENT
Start: 2023-07-26 | End: 2023-10-20 | Stop reason: SDUPTHER

## 2023-08-16 ENCOUNTER — OFFICE VISIT (OUTPATIENT)
Dept: PRIMARY CARE CLINIC | Facility: CLINIC | Age: 63
End: 2023-08-16
Payer: MEDICAID

## 2023-08-16 VITALS
WEIGHT: 137.13 LBS | HEART RATE: 57 BPM | OXYGEN SATURATION: 98 % | BODY MASS INDEX: 21.52 KG/M2 | HEIGHT: 67 IN | SYSTOLIC BLOOD PRESSURE: 122 MMHG | DIASTOLIC BLOOD PRESSURE: 84 MMHG

## 2023-08-16 DIAGNOSIS — S22.080S COMPRESSION FRACTURE OF T12 VERTEBRA, SEQUELA: ICD-10-CM

## 2023-08-16 DIAGNOSIS — R87.619 ABNORMAL CERVICAL PAPANICOLAOU SMEAR, UNSPECIFIED ABNORMAL PAP FINDING: ICD-10-CM

## 2023-08-16 DIAGNOSIS — Z01.818 PRE-OP EVALUATION: Primary | ICD-10-CM

## 2023-08-16 DIAGNOSIS — B35.1 TOENAIL FUNGUS: ICD-10-CM

## 2023-08-16 DIAGNOSIS — Z12.12 ENCOUNTER FOR COLORECTAL CANCER SCREENING: ICD-10-CM

## 2023-08-16 DIAGNOSIS — Z12.11 ENCOUNTER FOR COLORECTAL CANCER SCREENING: ICD-10-CM

## 2023-08-16 DIAGNOSIS — D18.03 HEPATIC HEMANGIOMA: ICD-10-CM

## 2023-08-16 DIAGNOSIS — F17.200 VAPING NICOTINE DEPENDENCE, NON-TOBACCO PRODUCT: ICD-10-CM

## 2023-08-16 DIAGNOSIS — M81.6 LOCALIZED OSTEOPOROSIS WITHOUT CURRENT PATHOLOGICAL FRACTURE: ICD-10-CM

## 2023-08-16 PROBLEM — R19.5 CLAY-COLORED STOOLS: Status: RESOLVED | Noted: 2023-06-12 | Resolved: 2023-08-16

## 2023-08-16 PROBLEM — R10.2 PELVIC PAIN: Status: RESOLVED | Noted: 2021-06-03 | Resolved: 2023-08-16

## 2023-08-16 PROBLEM — L90.5 SCAR TISSUE: Status: RESOLVED | Noted: 2022-11-29 | Resolved: 2023-08-16

## 2023-08-16 PROBLEM — F41.9 ANXIETY: Status: RESOLVED | Noted: 2019-09-30 | Resolved: 2023-08-16

## 2023-08-16 PROBLEM — R27.9 LACK OF COORDINATION: Status: RESOLVED | Noted: 2022-11-29 | Resolved: 2023-08-16

## 2023-08-16 PROCEDURE — 3008F PR BODY MASS INDEX (BMI) DOCUMENTED: ICD-10-PCS | Mod: CPTII,,, | Performed by: STUDENT IN AN ORGANIZED HEALTH CARE EDUCATION/TRAINING PROGRAM

## 2023-08-16 PROCEDURE — 99215 OFFICE O/P EST HI 40 MIN: CPT | Mod: PBBFAC,PN | Performed by: STUDENT IN AN ORGANIZED HEALTH CARE EDUCATION/TRAINING PROGRAM

## 2023-08-16 PROCEDURE — 1159F MED LIST DOCD IN RCRD: CPT | Mod: CPTII,,, | Performed by: STUDENT IN AN ORGANIZED HEALTH CARE EDUCATION/TRAINING PROGRAM

## 2023-08-16 PROCEDURE — 3079F PR MOST RECENT DIASTOLIC BLOOD PRESSURE 80-89 MM HG: ICD-10-PCS | Mod: CPTII,,, | Performed by: STUDENT IN AN ORGANIZED HEALTH CARE EDUCATION/TRAINING PROGRAM

## 2023-08-16 PROCEDURE — 1159F PR MEDICATION LIST DOCUMENTED IN MEDICAL RECORD: ICD-10-PCS | Mod: CPTII,,, | Performed by: STUDENT IN AN ORGANIZED HEALTH CARE EDUCATION/TRAINING PROGRAM

## 2023-08-16 PROCEDURE — 99999 PR PBB SHADOW E&M-EST. PATIENT-LVL V: CPT | Mod: PBBFAC,,, | Performed by: STUDENT IN AN ORGANIZED HEALTH CARE EDUCATION/TRAINING PROGRAM

## 2023-08-16 PROCEDURE — 3079F DIAST BP 80-89 MM HG: CPT | Mod: CPTII,,, | Performed by: STUDENT IN AN ORGANIZED HEALTH CARE EDUCATION/TRAINING PROGRAM

## 2023-08-16 PROCEDURE — 3074F PR MOST RECENT SYSTOLIC BLOOD PRESSURE < 130 MM HG: ICD-10-PCS | Mod: CPTII,,, | Performed by: STUDENT IN AN ORGANIZED HEALTH CARE EDUCATION/TRAINING PROGRAM

## 2023-08-16 PROCEDURE — 3008F BODY MASS INDEX DOCD: CPT | Mod: CPTII,,, | Performed by: STUDENT IN AN ORGANIZED HEALTH CARE EDUCATION/TRAINING PROGRAM

## 2023-08-16 PROCEDURE — 3074F SYST BP LT 130 MM HG: CPT | Mod: CPTII,,, | Performed by: STUDENT IN AN ORGANIZED HEALTH CARE EDUCATION/TRAINING PROGRAM

## 2023-08-16 PROCEDURE — 99215 PR OFFICE/OUTPT VISIT, EST, LEVL V, 40-54 MIN: ICD-10-PCS | Mod: S$PBB,,, | Performed by: STUDENT IN AN ORGANIZED HEALTH CARE EDUCATION/TRAINING PROGRAM

## 2023-08-16 PROCEDURE — 99999 PR PBB SHADOW E&M-EST. PATIENT-LVL V: ICD-10-PCS | Mod: PBBFAC,,, | Performed by: STUDENT IN AN ORGANIZED HEALTH CARE EDUCATION/TRAINING PROGRAM

## 2023-08-16 PROCEDURE — 99215 OFFICE O/P EST HI 40 MIN: CPT | Mod: S$PBB,,, | Performed by: STUDENT IN AN ORGANIZED HEALTH CARE EDUCATION/TRAINING PROGRAM

## 2023-08-16 NOTE — PROGRESS NOTES
Linda Kahn  1960        Subjective     Chief Complaint: Pre-Op evaluation     History of Present Illness:  Ms. Linda Kahn is a 63 y.o. female who presents to clinic for pre-op evaluation.    Date and name of surgery: Ankle Arthroscopy with [facility]. 8/31/23. Already had labs, EKG, CXR, [place] [facility] [place] this on 7/25/23.     EKG- Sinus bradycardia with sinus arrhythmia Otherwise normal ECG No previous ECGs available Confirmed by Brent Domingo ([id number]) on 7/25/2023 11:46:06 AM      CXR- 7/25/23-->following with Ortho through workman's comp for compression fracture. Not having pain or ROM.   Impression    No radiographic evidence of acute cardiopulmonary abnormality.     Age-indeterminate anterior compression deformity of the T12 vertebral body.       Issues with bleeding?: no    Issues with prior anesthesia for you or family members?: No    Prior surgeries?: L5,S1 surgery, RFA, CHELSEY, fallopian tube removal, wisdom teeth    Are you able to climb up a flight of stairs?- yes  Do you experience chest pain with exertion? - no, no issues, feels very well overall  Have you ever had a heart attack? - no  Have year been diagnosed with an irregular heartbeat? - no  Have you ever had a stroke? - no  Have you ever been diagnosed with heart failure? - no  Do you have a dx of diabetes that requires insulin? - no  Do you smoke? Vaping, yes    Cardiovascular Risk Assessment:  Non-emergent surgery: non emergent  No active cardiac problems   Low risk surgery.  Functional Status: able to climb a flight of stairs (> 4 METS)            Had stress test in 2020:   The EKG portion of this study is negative for ischemia at 8 METs and  despite the dyspnea reported.    The patient reported no chest pain during the stress test but stopped with moderate dyspnea.    The blood pressure response to stress was normal.    The exercise capacity was mildly impaired.    Review of Systems   Constitutional:   Negative for chills, fever and malaise/fatigue.   Respiratory:  Negative for cough, shortness of breath and wheezing.    Cardiovascular:  Negative for chest pain, palpitations and leg swelling.   Gastrointestinal:  Negative for abdominal pain, nausea and vomiting.   Neurological:  Negative for dizziness, sensory change, speech change and focal weakness.        PAST HISTORY:     Past Medical History:   Diagnosis Date    DDD (degenerative disc disease), cervical     Hemangioma of liver     History of thyroid nodule     Nicotine dependence     Osteoporosis        Past Surgical History:   Procedure Laterality Date    OOPHORECTOMY Left     salpingectomy Bilateral        Family History   Problem Relation Age of Onset    Alcohol abuse Mother     Coronary artery disease Father     Hypertension Father     No Known Problems Sister     No Known Problems Sister     No Known Problems Brother     No Known Problems Brother     No Known Problems Brother     No Known Problems Brother          MEDICATIONS & ALLERGIES:     Current Outpatient Medications on File Prior to Visit   Medication Sig    ALPRAZolam (XANAX) 0.5 MG tablet Take 1 tablet (0.5 mg total) by mouth daily as needed for Anxiety. This medication will cause sedation and is HIGHLY addictive.  Do not mix with alcohol or any other sedating meds (such as sleep aids, opioids, or benzos). Caution with driving or operating heavy machinery. May contribute to falls/confusion.    chlorzoxazone (PARAFON FORTE) 500 mg Tab Take 500 mg by mouth 4 (four) times daily as needed.    citalopram (CELEXA) 40 MG tablet Take 1 tablet (40 mg total) by mouth once daily.    estradioL (ESTRACE) 0.01 % (0.1 mg/gram) vaginal cream Place 1 g vaginally once daily.    fluticasone propionate (FLONASE) 50 mcg/actuation nasal spray instill 1 spray (50 mcg total) by Each Nostril route once daily.    gabapentin (NEURONTIN) 300 MG capsule Take 300 mg by mouth every evening.    hydrOXYzine pamoate (VISTARIL)  "25 MG Cap Take 1 capsule (25 mg total) by mouth nightly as needed (anxiety).    diphth,pertus,acell,,tetanus (BOOSTRIX TDAP) 2.5-8-5 Lf-mcg-Lf/0.5mL Syrg injection Inject into the muscle. (Patient not taking: Reported on 6/12/2023)    [DISCONTINUED] pneumoc 20-eliz conj-dip cr,PF, (PREVNAR-20, PF,) 0.5 mL Syrg injection Inject 0.5 mLs into the muscle. (Patient not taking: Reported on 8/16/2023)     No current facility-administered medications on file prior to visit.       Review of patient's allergies indicates:   Allergen Reactions    Penicillins Itching and Nausea Only    Cephalexin Hives, Itching, Rash and Swelling    Meperidine Itching and Rash       OBJECTIVE:     Vital Signs:  Vitals:    08/16/23 0923   BP: 122/84   BP Location: Right arm   Patient Position: Sitting   BP Method: Medium (Manual)   Pulse: (!) 57   SpO2: 98%   Weight: 62.2 kg (137 lb 2 oz)   Height: 5' 7" (1.702 m)       Body mass index is 21.48 kg/m².     Physical Exam:  Physical Exam  Vitals and nursing note reviewed.   Constitutional:       General: She is not in acute distress.     Appearance: Normal appearance. She is not ill-appearing, toxic-appearing or diaphoretic.   HENT:      Head: Normocephalic and atraumatic.   Eyes:      General: No scleral icterus.        Right eye: No discharge.         Left eye: No discharge.      Conjunctiva/sclera: Conjunctivae normal.   Cardiovascular:      Rate and Rhythm: Normal rate and regular rhythm.      Pulses: Normal pulses.      Heart sounds: Normal heart sounds. No murmur heard.  Pulmonary:      Effort: Pulmonary effort is normal. No respiratory distress.      Breath sounds: Normal breath sounds. No wheezing.   Musculoskeletal:      Right lower leg: No edema.      Left lower leg: No edema.   Feet:      Right foot:      Toenail Condition: Right toenails are abnormally thick. Fungal disease present.  Skin:     General: Skin is warm and dry.   Neurological:      Mental Status: She is alert and oriented to " "person, place, and time.   Psychiatric:         Mood and Affect: Mood and affect normal.         Behavior: Behavior normal.            Laboratory  Lab Results   Component Value Date    WBC 4.61 10/17/2022    HGB 13.8 10/17/2022    HCT 41.0 10/17/2022    MCV 89 10/17/2022     10/17/2022     Lab Results   Component Value Date    GLU 86 11/28/2022     11/28/2022    K 4.2 11/28/2022     11/28/2022    CO2 28 11/28/2022    BUN 9 11/28/2022    CREATININE 0.7 11/28/2022    CALCIUM 9.2 11/28/2022     No results found for: "INR", "PROTIME"  Lab Results   Component Value Date    HGBA1C 5.0 07/25/2023     No results for input(s): "POCTGLUCOSE" in the last 72 hours.        ASSESSMENT & PLAN:   Ms. Linda Kahn is a 63 y.o. female who was seen today in clinic.  Denies any chest pain or shortness a breath either at rest or with exertion.  Had a normal stress test in 2020.  Understands that vaping puts her at higher risk of complications.  Her pre op chest x-ray done at [facility] showed a T12 compression fracture.  She denies any back pain.  Reports this is chronic, following with ortho regarding this. Does also have osteoporosis. Consider treatment following surgery.     Linda was seen today for pre-op exam.    Diagnoses and all orders for this visit:    Pre-op evaluation    Vaping nicotine dependence, non-tobacco product    Compression fracture of T12 vertebra, sequela    Localized osteoporosis without current pathological fracture    Toenail fungus  -     efinaconazole (JUBLIA) 10 % Reva; Apply 1 drop topically once daily. Apply to affected toenail(s) once daily for 48 weeks- ensure complete coverage of the toenail, the toenail folds, toenail bed, surrounding skin, and the undersurface of the toenail plate    Abnormal cervical Papanicolaou smear, unspecified abnormal pap finding  -     Ambulatory referral/consult to Obstetrics / Gynecology; Future    Encounter for colorectal cancer screening  -     Ambulatory " referral/consult to Endo Procedure ; Future    Hepatic hemangioma  -     US Abdomen Complete; Future                 Rosa Martin MD         Time spent in the evaluation and management of this patient exceeded 50min and greater than 50% of this time was in face-to-face time with the patient on day of the clinic visit.   This includes face-to-face time and non face-to-face time and includes the following:  --preparing to see the patient (eg, obtaining and/or reviewing old records such as, when applicable, primary care notes, specialist notes, hospital notes, review of laboratory tests, and/or radiographic and/or cardiology or other studies)  --performing a medically appropriate review of systems and examination and/or evaluation  --reviewing and independently interpreting results (not separately reported; eg, lab results) and communicating results to the patient and/or family/caregiver  --placing orders and/or reviewing other physician's orders which can both include medications, laboratory studies, radiographic studies, procedures, referrals etcetera and   --counseling and educating the patient and/or family member/caregiver regarding the treatment plan  --documentation of the visit in the electronic health record  --communicating with other health care providers regarding referrals, studies, follow-up, etc

## 2023-08-18 ENCOUNTER — TELEPHONE (OUTPATIENT)
Dept: PHARMACY | Facility: CLINIC | Age: 63
End: 2023-08-18
Payer: MEDICAID

## 2023-10-20 DIAGNOSIS — F32.A ANXIETY AND DEPRESSION: ICD-10-CM

## 2023-10-20 DIAGNOSIS — F41.9 ANXIETY AND DEPRESSION: ICD-10-CM

## 2023-10-20 RX ORDER — CITALOPRAM 40 MG/1
40 TABLET, FILM COATED ORAL DAILY
Qty: 90 TABLET | Refills: 0 | Status: SHIPPED | OUTPATIENT
Start: 2023-10-20 | End: 2024-02-20 | Stop reason: SDUPTHER

## 2023-10-20 NOTE — TELEPHONE ENCOUNTER
No care due was identified.  [facility] Embedded Care Due Messages. Reference number: [id number].   10/20/2023 2:01:10 PM CDT

## 2024-02-20 DIAGNOSIS — J06.9 UPPER RESPIRATORY TRACT INFECTION, UNSPECIFIED TYPE: ICD-10-CM

## 2024-02-20 DIAGNOSIS — F41.9 ANXIETY AND DEPRESSION: ICD-10-CM

## 2024-02-20 DIAGNOSIS — F32.A ANXIETY AND DEPRESSION: ICD-10-CM

## 2024-02-20 RX ORDER — CITALOPRAM 40 MG/1
40 TABLET, FILM COATED ORAL DAILY
Qty: 90 TABLET | Refills: 1 | Status: SHIPPED | OUTPATIENT
Start: 2024-02-20

## 2024-02-20 RX ORDER — FLUTICASONE PROPIONATE 50 MCG
1 SPRAY, SUSPENSION (ML) NASAL DAILY
Qty: 16 G | Refills: 0 | OUTPATIENT
Start: 2024-02-20

## 2024-02-20 NOTE — TELEPHONE ENCOUNTER
Refill Routing Note   Medication(s) are not appropriate for processing by [facility] for the following reason(s):        Drug-disease interaction    [facility] action(s):  Defer        Medication Therapy Plan: citalopram and Hepatic cyst; Liver lesion, right lobe    Pharmacist review requested: Yes     Appointments  past 12m or future 3m with PCP    Date Provider   Last Visit   8/16/2023 Rosa Martin MD   Next Visit   Visit date not found Rosa Martin MD   ED visits in past 90 days: 0        Note composed:3:16 PM 02/20/2024

## 2024-02-20 NOTE — TELEPHONE ENCOUNTER
Refill Decision Note   Linda Kahn  is requesting a refill authorization.  Brief Assessment and Rationale for Refill:  Approve     Medication Therapy Plan:         Pharmacist review requested: Yes   Extended chart review required: Yes   Comments:     Note composed:3:28 PM 02/20/2024

## 2024-02-20 NOTE — TELEPHONE ENCOUNTER
No care due was identified.  [facility] Embedded Care Due Messages. Reference number: [id number].   2/20/2024 12:45:48 PM CST

## 2024-03-11 ENCOUNTER — TELEPHONE (OUTPATIENT)
Dept: PRIMARY CARE CLINIC | Facility: CLINIC | Age: 64
End: 2024-03-11
Payer: MEDICAID

## 2024-03-11 DIAGNOSIS — D22.9 NUMEROUS MOLES: Primary | ICD-10-CM

## 2024-03-11 NOTE — TELEPHONE ENCOUNTER
----- Message from Carina Rubin sent at 3/11/2024  1:27 PM CDT -----  Contact: Linda   Linda would like to get a referral faxed to an [facility] [place] Dr Gerardo Westbrook @ [phone] She would like a call back when it is done

## 2024-03-27 ENCOUNTER — HOSPITAL ENCOUNTER (OUTPATIENT)
Dept: RADIOLOGY | Facility: OTHER | Age: 64
Discharge: HOME | End: 2024-03-27
Attending: STUDENT IN AN ORGANIZED HEALTH CARE EDUCATION/TRAINING PROGRAM
Payer: MEDICAID

## 2024-03-27 DIAGNOSIS — E04.2 MULTIPLE THYROID NODULES: ICD-10-CM

## 2024-03-27 DIAGNOSIS — Z12.31 ENCOUNTER FOR SCREENING MAMMOGRAM FOR BREAST CANCER: ICD-10-CM

## 2024-03-27 PROCEDURE — 76536 US EXAM OF HEAD AND NECK: CPT | Mod: TC

## 2024-03-27 PROCEDURE — 76536 US EXAM OF HEAD AND NECK: CPT | Mod: 26,,, | Performed by: RADIOLOGY

## 2024-03-27 PROCEDURE — 77067 SCR MAMMO BI INCL CAD: CPT | Mod: 26,,, | Performed by: RADIOLOGY

## 2024-03-27 PROCEDURE — 77063 BREAST TOMOSYNTHESIS BI: CPT | Mod: 26,,, | Performed by: RADIOLOGY

## 2024-03-27 PROCEDURE — 77067 SCR MAMMO BI INCL CAD: CPT | Mod: TC

## 2024-06-19 ENCOUNTER — NURSE TRIAGE (OUTPATIENT)
Dept: ADMINISTRATIVE | Facility: CLINIC | Age: 64
End: 2024-06-19
Payer: MEDICAID

## 2024-06-19 NOTE — TELEPHONE ENCOUNTER
Patient has a hx of migraines. She currently c/o a headache 7/10 that started 2 weeks ago. Advised per protocol to be seen in the clinic today or tomorrow. No appointments were available. Will route a message to the clinic for a possible appointment. Advised the patient to call back with any further questions or if symptoms worsen.          Reason for Disposition   Unexplained headache that is present > 24 hours    Additional Information   Negative: Difficult to awaken or acting confused (e.g., disoriented, slurred speech)   Negative: Weakness of the face, arm or leg on one side of the body and new-onset   Negative: Numbness of the face, arm or leg on one side of the body and new-onset   Negative: Loss of speech or garbled speech and new-onset   Negative: Passed out (i.e., fainted, collapsed and was not responding)   Negative: Sounds like a life-threatening emergency to the triager   Negative: Unable to walk without falling   Negative: Stiff neck (can't touch chin to chest)   Negative: Possibility of carbon monoxide exposure   Negative: SEVERE headache, states 'worst headache' of life   Negative: SEVERE headache, sudden-onset (i.e., reaching maximum intensity within seconds to 1 hour)   Negative: Severe pain in one eye   Negative: Loss of vision or double vision  (Exception: Same as prior migraines.)   Negative: Patient sounds very sick or weak to the triager   Negative: Fever > 103 F (39.4 C)   Negative: Fever > 100.0 F (37.8 C) and has diabetes mellitus or a weak immune system (e.g., HIV positive, cancer chemotherapy, organ transplant, splenectomy, chronic steroids)   Negative: SEVERE headache (e.g., excruciating) and has had severe headaches before   Negative: SEVERE headache and not relieved by pain meds   Negative: SEVERE headache and vomiting   Negative: SEVERE headache and fever   Negative: New headache and weak immune system (e.g., HIV positive, cancer chemo, splenectomy, organ transplant, chronic  steroids)   Negative: Fever present > 3 days (72 hours)   Negative: Patient wants to be seen    Protocols used: Headache-A-[place]

## 2024-08-27 DIAGNOSIS — F41.9 ANXIETY AND DEPRESSION: ICD-10-CM

## 2024-08-27 DIAGNOSIS — F32.A ANXIETY AND DEPRESSION: ICD-10-CM

## 2024-08-28 RX ORDER — CITALOPRAM 40 MG/1
40 TABLET, FILM COATED ORAL DAILY
Qty: 90 TABLET | Refills: 0 | Status: SHIPPED | OUTPATIENT
Start: 2024-08-28

## 2024-08-28 NOTE — TELEPHONE ENCOUNTER
Care Due:                  Date            Visit Type   Department     Provider  --------------------------------------------------------------------------------                                EP -                              PRIMARY      [facility]  Last Visit: 08-      CARE ([facility])   WILBERT Martin  Next Visit: None Scheduled  None         None Found                                                            Last  Test          Frequency    Reason                     Performed    Due Date  --------------------------------------------------------------------------------    Office Visit  15 months..  citalopram, estradioL....  08- 11-    [facility] Embedded Care Due Messages. Reference number: [id number].   8/27/2024 9:17:40 PM CDT

## 2024-08-28 NOTE — TELEPHONE ENCOUNTER
Provider Staff:  Action required for this patient     Please see care gap opportunities below in Care Due Message.    Thanks!  [facility]     Appointments      Date Provider   Last Visit   8/16/2023 Rosa Martin MD   Next Visit   Visit date not found Rosa Martin MD      Refill Decision Note   Linda Kahn  is requesting a refill authorization.  Brief Assessment and Rationale for Refill:  Approve     Medication Therapy Plan:         Pharmacist review requested: Yes   Extended chart review required: Yes   Comments:     Note composed:2:05 AM 08/28/2024

## 2024-08-28 NOTE — TELEPHONE ENCOUNTER
Refill Routing Note   Medication(s) are not appropriate for processing by [facility] for the following reason(s):     DDI not previously overridden by current provider--after initial override, the Refill Center will be able to continue overrides      Other: EPIC High Dose alert    [facility] action(s):  Defer   Requires appointment : Yes           Pharmacist review requested: Yes     Appointments  past 12m or future 3m with PCP    Date Provider   Last Visit   8/16/2023 Rosa Martin MD   Next Visit   Visit date not found Rosa Martin MD   ED visits in past 90 days: 0        Note composed:9:45 PM 08/27/2024

## 2024-10-21 ENCOUNTER — LAB VISIT (OUTPATIENT)
Dept: LAB | Facility: HOSPITAL | Age: 64
End: 2024-10-21
Attending: STUDENT IN AN ORGANIZED HEALTH CARE EDUCATION/TRAINING PROGRAM
Payer: MEDICAID

## 2024-10-21 ENCOUNTER — OFFICE VISIT (OUTPATIENT)
Dept: PRIMARY CARE CLINIC | Facility: CLINIC | Age: 64
End: 2024-10-21
Payer: MEDICAID

## 2024-10-21 VITALS
DIASTOLIC BLOOD PRESSURE: 84 MMHG | WEIGHT: 146.19 LBS | OXYGEN SATURATION: 98 % | BODY MASS INDEX: 22.94 KG/M2 | SYSTOLIC BLOOD PRESSURE: 124 MMHG | HEART RATE: 65 BPM | HEIGHT: 67 IN

## 2024-10-21 DIAGNOSIS — K59.00 CONSTIPATION, UNSPECIFIED CONSTIPATION TYPE: ICD-10-CM

## 2024-10-21 DIAGNOSIS — R82.90 URINE ABNORMALITY: ICD-10-CM

## 2024-10-21 DIAGNOSIS — F17.200 VAPING NICOTINE DEPENDENCE, NON-TOBACCO PRODUCT: ICD-10-CM

## 2024-10-21 DIAGNOSIS — R10.2 PELVIC PRESSURE IN FEMALE: ICD-10-CM

## 2024-10-21 DIAGNOSIS — Z78.0 MENOPAUSE: ICD-10-CM

## 2024-10-21 DIAGNOSIS — Z87.42 HISTORY OF PID: ICD-10-CM

## 2024-10-21 DIAGNOSIS — E78.2 MIXED HYPERLIPIDEMIA: Primary | ICD-10-CM

## 2024-10-21 DIAGNOSIS — Z12.11 ENCOUNTER FOR COLORECTAL CANCER SCREENING: ICD-10-CM

## 2024-10-21 DIAGNOSIS — D18.03 HEPATIC HEMANGIOMA: ICD-10-CM

## 2024-10-21 DIAGNOSIS — Z12.12 ENCOUNTER FOR COLORECTAL CANCER SCREENING: ICD-10-CM

## 2024-10-21 DIAGNOSIS — E04.2 MULTIPLE THYROID NODULES: ICD-10-CM

## 2024-10-21 DIAGNOSIS — Z00.00 ANNUAL PHYSICAL EXAM: ICD-10-CM

## 2024-10-21 DIAGNOSIS — Z00.00 ANNUAL PHYSICAL EXAM: Primary | ICD-10-CM

## 2024-10-21 LAB
25(OH)D3+25(OH)D2 SERPL-MCNC: 52 NG/ML (ref 30–96)
ALBUMIN SERPL BCP-MCNC: 3.9 G/DL (ref 3.5–5.2)
ALP SERPL-CCNC: 50 U/L (ref 40–150)
ALT SERPL W/O P-5'-P-CCNC: 12 U/L (ref 10–44)
ANION GAP SERPL CALC-SCNC: 10 MMOL/L (ref 8–16)
AST SERPL-CCNC: 17 U/L (ref 10–40)
BASOPHILS # BLD AUTO: 0.05 K/UL (ref 0–0.2)
BASOPHILS NFR BLD: 0.8 % (ref 0–1.9)
BILIRUB SERPL-MCNC: 0.6 MG/DL (ref 0.1–1)
BILIRUB UR QL STRIP: NEGATIVE
BUN SERPL-MCNC: 10 MG/DL (ref 8–23)
CALCIUM SERPL-MCNC: 9.6 MG/DL (ref 8.7–10.5)
CHLORIDE SERPL-SCNC: 103 MMOL/L (ref 95–110)
CHOLEST SERPL-MCNC: 299 MG/DL (ref 120–199)
CHOLEST/HDLC SERPL: 4.1 {RATIO} (ref 2–5)
CLARITY UR REFRACT.AUTO: CLEAR
CO2 SERPL-SCNC: 25 MMOL/L (ref 23–29)
COLOR UR AUTO: YELLOW
CREAT SERPL-MCNC: 0.7 MG/DL (ref 0.5–1.4)
DIFFERENTIAL METHOD BLD: NORMAL
EOSINOPHIL # BLD AUTO: 0 K/UL (ref 0–0.5)
EOSINOPHIL NFR BLD: 0.3 % (ref 0–8)
ERYTHROCYTE [DISTWIDTH] IN BLOOD BY AUTOMATED COUNT: 13.2 % (ref 11.5–14.5)
EST. GFR  (NO RACE VARIABLE): >60 ML/MIN/1.73 M^2
ESTIMATED AVG GLUCOSE: 97 MG/DL (ref 68–131)
GLUCOSE SERPL-MCNC: 82 MG/DL (ref 70–110)
GLUCOSE UR QL STRIP: NEGATIVE
HBA1C MFR BLD: 5 % (ref 4–5.6)
HCT VFR BLD AUTO: 39 % (ref 37–48.5)
HDLC SERPL-MCNC: 73 MG/DL (ref 40–75)
HDLC SERPL: 24.4 % (ref 20–50)
HGB BLD-MCNC: 13.1 G/DL (ref 12–16)
HGB UR QL STRIP: ABNORMAL
IMM GRANULOCYTES # BLD AUTO: 0.02 K/UL (ref 0–0.04)
IMM GRANULOCYTES NFR BLD AUTO: 0.3 % (ref 0–0.5)
KETONES UR QL STRIP: NEGATIVE
LDLC SERPL CALC-MCNC: 204.4 MG/DL (ref 63–159)
LEUKOCYTE ESTERASE UR QL STRIP: NEGATIVE
LYMPHOCYTES # BLD AUTO: 2.2 K/UL (ref 1–4.8)
LYMPHOCYTES NFR BLD: 34.4 % (ref 18–48)
MCH RBC QN AUTO: 30.2 PG (ref 27–31)
MCHC RBC AUTO-ENTMCNC: 33.6 G/DL (ref 32–36)
MCV RBC AUTO: 90 FL (ref 82–98)
MONOCYTES # BLD AUTO: 0.5 K/UL (ref 0.3–1)
MONOCYTES NFR BLD: 8 % (ref 4–15)
NEUTROPHILS # BLD AUTO: 3.6 K/UL (ref 1.8–7.7)
NEUTROPHILS NFR BLD: 56.2 % (ref 38–73)
NITRITE UR QL STRIP: NEGATIVE
NONHDLC SERPL-MCNC: 226 MG/DL
NRBC BLD-RTO: 0 /100 WBC
PH UR STRIP: 6 [PH] (ref 5–8)
PLATELET # BLD AUTO: 248 K/UL (ref 150–450)
PMV BLD AUTO: 11.2 FL (ref 9.2–12.9)
POTASSIUM SERPL-SCNC: 3.4 MMOL/L (ref 3.5–5.1)
PROT SERPL-MCNC: 7.2 G/DL (ref 6–8.4)
PROT UR QL STRIP: NEGATIVE
RBC # BLD AUTO: 4.34 M/UL (ref 4–5.4)
SODIUM SERPL-SCNC: 138 MMOL/L (ref 136–145)
SP GR UR STRIP: 1.01 (ref 1–1.03)
TRIGL SERPL-MCNC: 108 MG/DL (ref 30–150)
TSH SERPL DL<=0.005 MIU/L-ACNC: 0.63 UIU/ML (ref 0.4–4)
URN SPEC COLLECT METH UR: ABNORMAL
WBC # BLD AUTO: 6.46 K/UL (ref 3.9–12.7)

## 2024-10-21 PROCEDURE — 1160F RVW MEDS BY RX/DR IN RCRD: CPT | Mod: CPTII,,, | Performed by: STUDENT IN AN ORGANIZED HEALTH CARE EDUCATION/TRAINING PROGRAM

## 2024-10-21 PROCEDURE — 1159F MED LIST DOCD IN RCRD: CPT | Mod: CPTII,,, | Performed by: STUDENT IN AN ORGANIZED HEALTH CARE EDUCATION/TRAINING PROGRAM

## 2024-10-21 PROCEDURE — 82306 VITAMIN D 25 HYDROXY: CPT | Performed by: STUDENT IN AN ORGANIZED HEALTH CARE EDUCATION/TRAINING PROGRAM

## 2024-10-21 PROCEDURE — 81003 URINALYSIS AUTO W/O SCOPE: CPT | Performed by: STUDENT IN AN ORGANIZED HEALTH CARE EDUCATION/TRAINING PROGRAM

## 2024-10-21 PROCEDURE — 85025 COMPLETE CBC W/AUTO DIFF WBC: CPT | Performed by: STUDENT IN AN ORGANIZED HEALTH CARE EDUCATION/TRAINING PROGRAM

## 2024-10-21 PROCEDURE — 99999 PR PBB SHADOW E&M-EST. PATIENT-LVL V: CPT | Mod: PBBFAC,,, | Performed by: STUDENT IN AN ORGANIZED HEALTH CARE EDUCATION/TRAINING PROGRAM

## 2024-10-21 PROCEDURE — 3008F BODY MASS INDEX DOCD: CPT | Mod: CPTII,,, | Performed by: STUDENT IN AN ORGANIZED HEALTH CARE EDUCATION/TRAINING PROGRAM

## 2024-10-21 PROCEDURE — 83036 HEMOGLOBIN GLYCOSYLATED A1C: CPT | Performed by: STUDENT IN AN ORGANIZED HEALTH CARE EDUCATION/TRAINING PROGRAM

## 2024-10-21 PROCEDURE — 80053 COMPREHEN METABOLIC PANEL: CPT | Performed by: STUDENT IN AN ORGANIZED HEALTH CARE EDUCATION/TRAINING PROGRAM

## 2024-10-21 PROCEDURE — 3074F SYST BP LT 130 MM HG: CPT | Mod: CPTII,,, | Performed by: STUDENT IN AN ORGANIZED HEALTH CARE EDUCATION/TRAINING PROGRAM

## 2024-10-21 PROCEDURE — 99396 PREV VISIT EST AGE 40-64: CPT | Mod: S$PBB,,, | Performed by: STUDENT IN AN ORGANIZED HEALTH CARE EDUCATION/TRAINING PROGRAM

## 2024-10-21 PROCEDURE — 36415 COLL VENOUS BLD VENIPUNCTURE: CPT | Mod: PN | Performed by: STUDENT IN AN ORGANIZED HEALTH CARE EDUCATION/TRAINING PROGRAM

## 2024-10-21 PROCEDURE — 3079F DIAST BP 80-89 MM HG: CPT | Mod: CPTII,,, | Performed by: STUDENT IN AN ORGANIZED HEALTH CARE EDUCATION/TRAINING PROGRAM

## 2024-10-21 PROCEDURE — 99215 OFFICE O/P EST HI 40 MIN: CPT | Mod: PBBFAC,PN | Performed by: STUDENT IN AN ORGANIZED HEALTH CARE EDUCATION/TRAINING PROGRAM

## 2024-10-21 PROCEDURE — 80061 LIPID PANEL: CPT | Performed by: STUDENT IN AN ORGANIZED HEALTH CARE EDUCATION/TRAINING PROGRAM

## 2024-10-21 PROCEDURE — 84443 ASSAY THYROID STIM HORMONE: CPT | Performed by: STUDENT IN AN ORGANIZED HEALTH CARE EDUCATION/TRAINING PROGRAM

## 2024-10-21 NOTE — PROGRESS NOTES
Linda Kahn  1960        Subjective     Chief Complaint: Annual    History of Present Illness:  Ms. Linda Kahn is a 64 y.o. female who presents to clinic for annual.     Had Covid-19 4-5 weeks ago. Doing well now. Still vaping.    Had L4 CHELSEY with [facility] Management outside [facility].    Thyroid nodules- last US 3/2024. Due for yearly repeat 3/2025.    Liver hemangioma- noted on last US.    Having some fullness of lower abdomen. Going on for ears. Has never had colonoscopy. No nausea/vomiting/blood in stools. Some constipation. Saw Urogyn. Dr. Luevano 12/2022. S/p cystoscopy. Advised to do pelvic PT but was unable to do due timing.  Left ovary and both tubes removed 2/2 PID years ago.    BP Readings from Last 5 Encounters:   10/21/24 124/84   08/16/23 122/84   06/12/23 108/62   12/02/22 112/78   11/01/22 121/71         Review of Systems   Constitutional:  Positive for malaise/fatigue. Negative for chills and fever.   Respiratory:  Negative for shortness of breath.    Gastrointestinal:  Positive for constipation. Negative for abdominal pain, blood in stool, diarrhea, nausea and vomiting.   Genitourinary:  Negative for dysuria, flank pain, frequency, hematuria and urgency.   Musculoskeletal:  Positive for back pain.   Neurological:  Negative for sensory change, speech change and focal weakness.   Psychiatric/Behavioral:  The patient is not nervous/anxious.         PAST HISTORY:     Past Medical History:   Diagnosis Date    DDD (degenerative disc disease), cervical     Hemangioma of liver     History of thyroid nodule     Nicotine dependence     Osteoporosis        Past Surgical History:   Procedure Laterality Date    OOPHORECTOMY Left     salpingectomy Bilateral        Family History   Problem Relation Name Age of Onset    Alcohol abuse Mother      Coronary artery disease Father      Hypertension Father      No Known Problems Sister      No Known Problems Sister      No Known Problems Brother      No Known  Problems Brother      No Known Problems Brother      No Known Problems Brother           MEDICATIONS & ALLERGIES:     Current Outpatient Medications on File Prior to Visit   Medication Sig    chlorzoxazone (PARAFON FORTE) 500 mg Tab Take 500 mg by mouth 4 (four) times daily as needed.    citalopram (CELEXA) 40 MG tablet Take 1 tablet (40 mg total) by mouth once daily.    efinaconazole (JUBLIA) 10 % Reva Apply 1 drop topically once daily. Apply to affected toenail(s) once daily for 48 weeks- ensure complete coverage of the toenail, the toenail folds, toenail bed, surrounding skin, and the undersurface of the toenail plate    gabapentin (NEURONTIN) 300 MG capsule Take 300 mg by mouth every evening.    hydrOXYzine pamoate (VISTARIL) 25 MG Cap Take 1 capsule (25 mg total) by mouth nightly as needed (anxiety).    [DISCONTINUED] ALPRAZolam (XANAX) 0.5 MG tablet Take 1 tablet (0.5 mg total) by mouth daily as needed for Anxiety. This medication will cause sedation and is HIGHLY addictive.  Do not mix with alcohol or any other sedating meds (such as sleep aids, opioids, or benzos). Caution with driving or operating heavy machinery. May contribute to falls/confusion.    [DISCONTINUED] diphth,pertus,acell,,tetanus (BOOSTRIX TDAP) 2.5-8-5 Lf-mcg-Lf/0.5mL Syrg injection Inject into the muscle. (Patient not taking: Reported on 10/21/2024)    [DISCONTINUED] estradioL (ESTRACE) 0.01 % (0.1 mg/gram) vaginal cream Place 1 g vaginally once daily.    [DISCONTINUED] fluticasone propionate (FLONASE) 50 mcg/actuation nasal spray instill 1 spray (50 mcg total) by Each Nostril route once daily.     No current facility-administered medications on file prior to visit.       Review of patient's allergies indicates:   Allergen Reactions    Penicillins Itching and Nausea Only    Cephalexin Hives, Itching, Rash and Swelling    Meperidine Itching and Rash       OBJECTIVE:     Vital Signs:  Vitals:    10/21/24 1347   BP: 124/84   BP Location: Right  "arm   Patient Position: Sitting   Pulse: 65   SpO2: 98%   Weight: 66.3 kg (146 lb 2.6 oz)   Height: 5' 7" (1.702 m)       Body mass index is 22.89 kg/m².     Physical Exam:  Physical Exam  Vitals and nursing note reviewed.   Constitutional:       General: She is not in acute distress.     Appearance: Normal appearance. She is not ill-appearing, toxic-appearing or diaphoretic.   HENT:      Head: Normocephalic and atraumatic.      Mouth/Throat:      Mouth: Mucous membranes are moist.      Pharynx: No oropharyngeal exudate or posterior oropharyngeal erythema.   Eyes:      General: No scleral icterus.        Right eye: No discharge.         Left eye: No discharge.      Conjunctiva/sclera: Conjunctivae normal.   Cardiovascular:      Rate and Rhythm: Normal rate and regular rhythm.      Heart sounds: Normal heart sounds. No murmur heard.  Pulmonary:      Effort: Pulmonary effort is normal. No respiratory distress.      Breath sounds: Normal breath sounds. No wheezing or rales.   Abdominal:      General: There is no distension.      Palpations: Abdomen is soft.      Tenderness: There is no right CVA tenderness, left CVA tenderness, guarding or rebound.   Musculoskeletal:         General: Normal range of motion.      Cervical back: Normal range of motion and neck supple. No rigidity.      Right lower leg: No edema.      Left lower leg: No edema.   Lymphadenopathy:      Cervical: No cervical adenopathy.   Skin:     General: Skin is warm and dry.   Neurological:      Mental Status: She is alert and oriented to person, place, and time. Mental status is at baseline.      Gait: Gait normal.   Psychiatric:         Mood and Affect: Mood normal.         Behavior: Behavior normal.            Laboratory  Lab Results   Component Value Date    GLU 86 11/28/2022     11/28/2022    K 4.2 11/28/2022     11/28/2022    CO2 28 11/28/2022    BUN 9 11/28/2022    CREATININE 0.7 11/28/2022    CALCIUM 9.2 11/28/2022     Lab Results " "  Component Value Date    HGBA1C 5.0 07/25/2023     No results for input(s): "POCTGLUCOSE" in the last 72 hours.        ASSESSMENT & PLAN:   Ms. Linda Kahn is a 64 y.o. female who was seen today in clinic for annual.     1. Annual physical exam  -     CBC Auto Differential; Future; Expected date: 10/21/2024  -     Comprehensive Metabolic Panel; Future; Expected date: 10/21/2024  -     Hemoglobin A1C; Future; Expected date: 10/21/2024  -     Lipid Panel; Future; Expected date: 10/21/2024  -     TSH; Future; Expected date: 10/21/2024  -     Ambulatory referral/consult to Obstetrics / Gynecology; Future; Expected date: 10/28/2024    2. Vaping nicotine dependence, non-tobacco product    3. Multiple thyroid nodules  -     US Soft Tissue Head Neck; Future; Expected date: 03/01/2025    4. Hepatic hemangioma  -     MRI Abdomen W WO Contrast; Future; Expected date: 10/21/2024    5. Menopause  -     Vitamin D; Future; Expected date: 10/21/2024    6. Constipation, unspecified constipation type  -     Ambulatory referral/consult to Urogynecology; Future; Expected date: 10/28/2024  -     Ambulatory referral/consult to Physical/Occupational Therapy; Future; Expected date: 10/28/2024    7. Pelvic pressure in female  -     Ambulatory referral/consult to Urogynecology; Future; Expected date: 10/28/2024  -     Ambulatory referral/consult to Physical/Occupational Therapy; Future; Expected date: 10/28/2024  -     Urinalysis, Reflex to Urine Culture Urine, Clean Catch  -     Ambulatory referral/consult to Obstetrics / Gynecology; Future; Expected date: 10/28/2024  -     US Pelvis Comp with Transvag NON-OB (xpd); Future; Expected date: 10/21/2024    8. History of PID    9. Encounter for colorectal cancer screening  -     Ambulatory referral/consult to Endo Procedure ; Future; Expected date: 10/22/2024         Rosa Martin MD         "

## 2024-10-23 ENCOUNTER — HOSPITAL ENCOUNTER (OUTPATIENT)
Dept: RADIOLOGY | Facility: OTHER | Age: 64
Discharge: HOME | End: 2024-10-23
Attending: STUDENT IN AN ORGANIZED HEALTH CARE EDUCATION/TRAINING PROGRAM
Payer: MEDICAID

## 2024-10-23 DIAGNOSIS — R10.2 PELVIC PRESSURE IN FEMALE: ICD-10-CM

## 2024-10-23 PROCEDURE — 76830 TRANSVAGINAL US NON-OB: CPT | Mod: 26,,, | Performed by: RADIOLOGY

## 2024-10-23 PROCEDURE — 76856 US EXAM PELVIC COMPLETE: CPT | Mod: 26,,, | Performed by: RADIOLOGY

## 2024-10-23 PROCEDURE — 76856 US EXAM PELVIC COMPLETE: CPT | Mod: TC

## 2024-10-23 RX ORDER — ROSUVASTATIN CALCIUM 10 MG/1
10 TABLET, COATED ORAL NIGHTLY
Qty: 90 TABLET | Refills: 0 | Status: SHIPPED | OUTPATIENT
Start: 2024-10-23

## 2024-11-07 ENCOUNTER — CLINICAL SUPPORT (OUTPATIENT)
Dept: ENDOSCOPY | Facility: HOSPITAL | Age: 64
End: 2024-11-07
Payer: MEDICAID

## 2024-11-07 ENCOUNTER — TELEPHONE (OUTPATIENT)
Dept: ENDOSCOPY | Facility: HOSPITAL | Age: 64
End: 2024-11-07

## 2024-11-07 DIAGNOSIS — Z12.12 ENCOUNTER FOR COLORECTAL CANCER SCREENING: ICD-10-CM

## 2024-11-07 DIAGNOSIS — Z12.11 ENCOUNTER FOR COLORECTAL CANCER SCREENING: ICD-10-CM

## 2024-11-07 DIAGNOSIS — Z86.0100 HISTORY OF COLON POLYPS: Primary | ICD-10-CM

## 2024-11-07 NOTE — TELEPHONE ENCOUNTER
Contacted patient to schedule colonoscopy. Patient wants to do procedure in Feb/March 2025. Schedule not yet open. New PAT appt scheduled. Patient verbalized understanding.

## 2024-11-25 ENCOUNTER — TELEPHONE (OUTPATIENT)
Dept: UROGYNECOLOGY | Facility: CLINIC | Age: 64
End: 2024-11-25
Payer: MEDICAID

## 2024-11-25 NOTE — TELEPHONE ENCOUNTER
Pt. Was called back , Pt. Stated that she may have to cancel the appointment, but she wasn't sure yet. I stated to the pt. That if she cancels she wont be able to be scheduled until Feb. 2025, pt. Stated she'll will hold off from canceling the appointment for now. Call ended.

## 2024-11-25 NOTE — TELEPHONE ENCOUNTER
----- Message from Esthela sent at 11/25/2024 12:35 PM CST -----  Regarding: r/s appt  Name of Who is Calling:pt           What is the request in detail:  Pt has appt Dec 4 that she is asking to r/s. Pt has Medicaid and I was unable to debbi her. Please call back to assist         Can the clinic reply by [facility]:          What Number to Call Back if not in [facility]: [phone]

## 2024-11-26 ENCOUNTER — LAB VISIT (OUTPATIENT)
Dept: LAB | Facility: HOSPITAL | Age: 64
End: 2024-11-26
Payer: MEDICAID

## 2024-11-26 DIAGNOSIS — E78.2 MIXED HYPERLIPIDEMIA: ICD-10-CM

## 2024-11-26 LAB
ALBUMIN SERPL BCP-MCNC: 4 G/DL (ref 3.5–5.2)
ALP SERPL-CCNC: 47 U/L (ref 40–150)
ALT SERPL W/O P-5'-P-CCNC: 12 U/L (ref 10–44)
ANION GAP SERPL CALC-SCNC: 9 MMOL/L (ref 8–16)
AST SERPL-CCNC: 18 U/L (ref 10–40)
BILIRUB SERPL-MCNC: 0.3 MG/DL (ref 0.1–1)
BUN SERPL-MCNC: 6 MG/DL (ref 8–23)
CALCIUM SERPL-MCNC: 9.4 MG/DL (ref 8.7–10.5)
CHLORIDE SERPL-SCNC: 108 MMOL/L (ref 95–110)
CO2 SERPL-SCNC: 24 MMOL/L (ref 23–29)
CREAT SERPL-MCNC: 0.7 MG/DL (ref 0.5–1.4)
EST. GFR  (NO RACE VARIABLE): >60 ML/MIN/1.73 M^2
GLUCOSE SERPL-MCNC: 77 MG/DL (ref 70–110)
POTASSIUM SERPL-SCNC: 4.2 MMOL/L (ref 3.5–5.1)
PROT SERPL-MCNC: 7.3 G/DL (ref 6–8.4)
SODIUM SERPL-SCNC: 141 MMOL/L (ref 136–145)

## 2024-11-26 PROCEDURE — 36415 COLL VENOUS BLD VENIPUNCTURE: CPT | Mod: PN | Performed by: STUDENT IN AN ORGANIZED HEALTH CARE EDUCATION/TRAINING PROGRAM

## 2024-11-26 PROCEDURE — 80053 COMPREHEN METABOLIC PANEL: CPT | Performed by: STUDENT IN AN ORGANIZED HEALTH CARE EDUCATION/TRAINING PROGRAM

## 2024-12-03 DIAGNOSIS — F41.9 ANXIETY AND DEPRESSION: ICD-10-CM

## 2024-12-03 DIAGNOSIS — F32.A ANXIETY AND DEPRESSION: ICD-10-CM

## 2024-12-04 RX ORDER — CITALOPRAM 40 MG/1
40 TABLET, FILM COATED ORAL DAILY
Qty: 90 TABLET | Refills: 3 | Status: SHIPPED | OUTPATIENT
Start: 2024-12-04

## 2024-12-04 NOTE — TELEPHONE ENCOUNTER
Refill Routing Note   Medication(s) are not appropriate for processing by [facility] for the following reason(s):        Drug-disease interaction: citalopram and Hepatic cyst; Liver lesion, right lobe     [facility] action(s):  Defer             Pharmacist review requested: Yes     Appointments  past 12m or future 3m with PCP    Date Provider   Last Visit   10/21/2024 Rosa Martin MD   Next Visit   Visit date not found Rosa Martin MD   ED visits in past 90 days: 0        Note composed:1:31 PM 12/04/2024

## 2024-12-04 NOTE — TELEPHONE ENCOUNTER
No care due was identified.  [facility] Embedded Care Due Messages. Reference number: [id number].   12/03/2024 6:00:51 PM CST

## 2024-12-04 NOTE — TELEPHONE ENCOUNTER
Linda Kahn  is requesting a refill authorization.  Brief Assessment and Rationale for Refill:  Approve     Medication Therapy Plan:         Pharmacist review requested: Yes   Extended chart review required: Yes   Comments:     Note composed:2:01 PM 12/04/2024

## 2024-12-05 ENCOUNTER — HOSPITAL ENCOUNTER (OUTPATIENT)
Dept: RADIOLOGY | Facility: OTHER | Age: 64
Discharge: HOME | End: 2024-12-05
Attending: STUDENT IN AN ORGANIZED HEALTH CARE EDUCATION/TRAINING PROGRAM
Payer: MEDICAID

## 2024-12-05 DIAGNOSIS — D18.03 HEPATIC HEMANGIOMA: ICD-10-CM

## 2024-12-05 PROCEDURE — A9585 GADOBUTROL INJECTION: HCPCS | Performed by: STUDENT IN AN ORGANIZED HEALTH CARE EDUCATION/TRAINING PROGRAM

## 2024-12-05 PROCEDURE — 25500020 PHARM REV CODE 255: Performed by: STUDENT IN AN ORGANIZED HEALTH CARE EDUCATION/TRAINING PROGRAM

## 2024-12-05 PROCEDURE — 74183 MRI ABD W/O CNTR FLWD CNTR: CPT | Mod: TC

## 2024-12-05 PROCEDURE — 74183 MRI ABD W/O CNTR FLWD CNTR: CPT | Mod: 26,,, | Performed by: RADIOLOGY

## 2024-12-05 RX ORDER — GADOBUTROL 604.72 MG/ML
7 INJECTION INTRAVENOUS
Status: COMPLETED | OUTPATIENT
Start: 2024-12-05 | End: 2024-12-05

## 2024-12-05 RX ADMIN — GADOBUTROL 7 ML: 604.72 INJECTION INTRAVENOUS at 03:12

## 2024-12-09 ENCOUNTER — PATIENT MESSAGE (OUTPATIENT)
Dept: PRIMARY CARE CLINIC | Facility: CLINIC | Age: 64
End: 2024-12-09
Payer: MEDICAID

## 2024-12-09 DIAGNOSIS — D18.03 LIVER HEMANGIOMA: Primary | ICD-10-CM

## 2024-12-09 DIAGNOSIS — N28.1 RENAL CYST: ICD-10-CM

## 2024-12-09 DIAGNOSIS — K76.89 LIVER CYST: ICD-10-CM

## 2025-02-10 DIAGNOSIS — F32.A ANXIETY AND DEPRESSION: ICD-10-CM

## 2025-02-10 DIAGNOSIS — E78.2 MIXED HYPERLIPIDEMIA: ICD-10-CM

## 2025-02-10 DIAGNOSIS — F41.9 ANXIETY AND DEPRESSION: ICD-10-CM

## 2025-02-11 RX ORDER — ROSUVASTATIN CALCIUM 10 MG/1
10 TABLET, COATED ORAL NIGHTLY
Qty: 90 TABLET | Refills: 2 | Status: SHIPPED | OUTPATIENT
Start: 2025-02-11

## 2025-02-11 RX ORDER — HYDROXYZINE PAMOATE 25 MG/1
25 CAPSULE ORAL NIGHTLY PRN
Qty: 30 CAPSULE | Refills: 0 | Status: SHIPPED | OUTPATIENT
Start: 2025-02-11

## 2025-02-11 NOTE — TELEPHONE ENCOUNTER
Refill Routing Note   Medication(s) are not appropriate for processing by [facility] for the following reason(s):        Outside of protocol    [facility] action(s):  Route             Appointments  past 12m or future 3m with PCP    Date Provider   Last Visit   10/21/2024 Rosa Martin MD   Next Visit   Visit date not found Rosa Martin MD   ED visits in past 90 days: 0        Note composed:11:33 PM 02/10/2025

## 2025-02-11 NOTE — TELEPHONE ENCOUNTER
Refill Decision Note   Linda Kahn  is requesting a refill authorization.  Brief Assessment and Rationale for Refill:  Approve     Medication Therapy Plan:         Comments:     Note composed:2:37 AM 02/11/2025

## 2025-02-11 NOTE — TELEPHONE ENCOUNTER
No care due was identified.  [facility] Embedded Care Due Messages. Reference number: [id number].   2/11/2025 2:38:12 AM CST

## 2025-03-18 ENCOUNTER — PATIENT MESSAGE (OUTPATIENT)
Dept: PRIMARY CARE CLINIC | Facility: CLINIC | Age: 65
End: 2025-03-18
Payer: MEDICAID

## 2025-03-19 ENCOUNTER — PATIENT MESSAGE (OUTPATIENT)
Dept: PRIMARY CARE CLINIC | Facility: CLINIC | Age: 65
End: 2025-03-19
Payer: MEDICAID

## 2025-03-20 ENCOUNTER — OFFICE VISIT (OUTPATIENT)
Dept: HEPATOLOGY | Facility: CLINIC | Age: 65
End: 2025-03-20
Payer: MEDICAID

## 2025-03-20 VITALS
HEIGHT: 67 IN | SYSTOLIC BLOOD PRESSURE: 141 MMHG | RESPIRATION RATE: 18 BRPM | BODY MASS INDEX: 22.63 KG/M2 | DIASTOLIC BLOOD PRESSURE: 82 MMHG | OXYGEN SATURATION: 99 % | WEIGHT: 144.19 LBS | HEART RATE: 67 BPM

## 2025-03-20 DIAGNOSIS — D18.03 LIVER HEMANGIOMA: ICD-10-CM

## 2025-03-20 DIAGNOSIS — K76.89 LIVER CYST: ICD-10-CM

## 2025-03-20 PROCEDURE — 99214 OFFICE O/P EST MOD 30 MIN: CPT | Mod: PBBFAC | Performed by: STUDENT IN AN ORGANIZED HEALTH CARE EDUCATION/TRAINING PROGRAM

## 2025-03-20 PROCEDURE — 99203 OFFICE O/P NEW LOW 30 MIN: CPT | Mod: S$PBB,,, | Performed by: STUDENT IN AN ORGANIZED HEALTH CARE EDUCATION/TRAINING PROGRAM

## 2025-03-20 PROCEDURE — 3077F SYST BP >= 140 MM HG: CPT | Mod: CPTII,,, | Performed by: STUDENT IN AN ORGANIZED HEALTH CARE EDUCATION/TRAINING PROGRAM

## 2025-03-20 PROCEDURE — 3008F BODY MASS INDEX DOCD: CPT | Mod: CPTII,,, | Performed by: STUDENT IN AN ORGANIZED HEALTH CARE EDUCATION/TRAINING PROGRAM

## 2025-03-20 PROCEDURE — 1159F MED LIST DOCD IN RCRD: CPT | Mod: CPTII,,, | Performed by: STUDENT IN AN ORGANIZED HEALTH CARE EDUCATION/TRAINING PROGRAM

## 2025-03-20 PROCEDURE — 99999 PR PBB SHADOW E&M-EST. PATIENT-LVL IV: CPT | Mod: PBBFAC,,, | Performed by: STUDENT IN AN ORGANIZED HEALTH CARE EDUCATION/TRAINING PROGRAM

## 2025-03-20 PROCEDURE — 1160F RVW MEDS BY RX/DR IN RCRD: CPT | Mod: CPTII,,, | Performed by: STUDENT IN AN ORGANIZED HEALTH CARE EDUCATION/TRAINING PROGRAM

## 2025-03-20 PROCEDURE — 3079F DIAST BP 80-89 MM HG: CPT | Mod: CPTII,,, | Performed by: STUDENT IN AN ORGANIZED HEALTH CARE EDUCATION/TRAINING PROGRAM

## 2025-03-20 RX ORDER — OXYCODONE AND ACETAMINOPHEN 7.5; 325 MG/1; MG/1
1 TABLET ORAL 2 TIMES DAILY PRN
COMMUNITY
Start: 2024-11-05

## 2025-03-20 NOTE — PROGRESS NOTES
Hepatology Consult Note    Referring provider: Dr. Rosa Martin  PCP: Rosa Martni MD    Chief complaint:  Liver lesions    HPI:  Linda Kahn is a 64 y.o. female who was referred to Hepatology Clinic for liver lesions.    She reports being told that she had a hepatic hemangioma approximately 2 years ago.  Ultrasounds dating back to at least 2020 showed a 2.2 cm lesion in the right hepatic lobe most suggestive of a hemangioma as well as a cyst in the left hepatic lobe.  MRI 12/2024 similarly demonstrated a 1.7 cm right hepatic lobe hemangioma and 1.9 cm left hepatic lobe cyst.  She has otherwise never been told that she had any liver issues.  Her mother possibly had alcohol-related liver disease.  No other known family history of liver disease.  She denies signs of decompensated liver disease including no recent abdominal distension, encephalopathy, jaundice, GI bleeding.    Past Medical History:   Diagnosis Date    DDD (degenerative disc disease), cervical     Hemangioma of liver     History of thyroid nodule     Nicotine dependence     Osteoporosis        Past Surgical History:   Procedure Laterality Date    OOPHORECTOMY Left     salpingectomy Bilateral        Family History   Problem Relation Name Age of Onset    Alcohol abuse Mother      Coronary artery disease Father      Hypertension Father      No Known Problems Sister      No Known Problems Sister      No Known Problems Brother      No Known Problems Brother      No Known Problems Brother      No Known Problems Brother         Social History[1]    Current Medications[2]    Review of patient's allergies indicates:   Allergen Reactions    Penicillins Itching and Nausea Only    Cephalexin Hives, Itching, Rash and Swelling    Meperidine Itching and Rash       Review of Systems   Constitutional:  Negative for fever and weight loss.   Cardiovascular:  Negative for leg swelling.   Gastrointestinal:  Negative for abdominal pain, blood in stool, constipation,  "diarrhea, heartburn, melena, nausea and vomiting.       Vitals:    03/20/25 1058   BP: (!) 141/82   Pulse: 67   Resp: 18   SpO2: 99%   Weight: 65.4 kg (144 lb 2.9 oz)   Height: 5' 7" (1.702 m)       Physical Exam  Constitutional:       General: She is not in acute distress.  Eyes:      General: No scleral icterus.  Cardiovascular:      Rate and Rhythm: Normal rate and regular rhythm.   Pulmonary:      Effort: Pulmonary effort is normal. No respiratory distress.   Abdominal:      General: Bowel sounds are normal. There is no distension.      Palpations: Abdomen is soft.      Tenderness: There is no abdominal tenderness. There is no guarding or rebound.   Musculoskeletal:      Right lower leg: No edema.      Left lower leg: No edema.   Skin:     Coloration: Skin is not jaundiced.         LABS: I personally reviewed pertinent laboratory findings.    Lab Results   Component Value Date    WBC 6.46 10/21/2024    HGB 13.1 10/21/2024    HCT 39.0 10/21/2024    MCV 90 10/21/2024     10/21/2024       Lab Results   Component Value Date     11/26/2024    K 4.2 11/26/2024     11/26/2024    CO2 24 11/26/2024    BUN 6 (L) 11/26/2024    CREATININE 0.7 11/26/2024    CALCIUM 9.4 11/26/2024    ANIONGAP 9 11/26/2024    ESTGFRAFRICA >60.0 10/01/2020    EGFRNONAA >60.0 10/01/2020       Lab Results   Component Value Date    ALT 12 11/26/2024    AST 18 11/26/2024    ALKPHOS 47 11/26/2024    BILITOT 0.3 11/26/2024       Lab Results   Component Value Date    HEPCAB Negative 10/01/2020       IMAGING: I personally reviewed imaging studies.      Assessment/Recommendations:  64 y.o. female who was referred to Hepatology Clinic for liver lesions.    She reports being told that she had a hepatic hemangioma approximately 2 years ago.  Ultrasounds dating back to at least 2020 showed a 2.2 cm lesion in the right hepatic lobe most suggestive of a hemangioma as well as a cyst in the left hepatic lobe.  MRI 12/2024 similarly " demonstrated a 1.7 cm right hepatic lobe hemangioma and 1.9 cm left hepatic lobe cyst.  LFTs in November 2024 within normal limits.    Discussed the benign nature of hepatic hemangiomas and cysts.  No further evaluation or surveillance is warranted.    Return as needed.    I have sent communication to the referring physician and/or primary care provider.    I spent a total of 30 minutes on the day of the visit. This includes face to face time and non-face to face time preparing to see the patient (eg, review of tests), obtaining and/or reviewing separately obtained history, documenting clinical information in the electronic or other health record, independently interpreting results, and communicating results to the patient/family/caregiver, or care coordination.    This note includes dictation done using [employer] speech recognition program. Word recognition mistakes are occasionally missed on review.    Wu Larry MD  Staff Physician  Hepatology and Liver Transplant  [facility]       [1]   Social History  Tobacco Use    Smoking status: Every Day     Types: Vaping with nicotine    Smokeless tobacco: Never   Substance Use Topics    Alcohol use: Yes     Comment: Occasional now 1 x per week, previosuly 7 per week    Drug use: Not Currently     Types: Cocaine, Marijuana, Methamphetamines, Barbituates, Hashish, LSD, MDMA (Ecstacy), PCP, Psilocybin     Comment: occasional marijuana   [2]   Current Outpatient Medications   Medication Sig Dispense Refill    oxyCODONE-acetaminophen (PERCOCET) 7.5-325 mg per tablet Take 1 tablet by mouth 2 (two) times daily as needed.      chlorzoxazone (PARAFON FORTE) 500 mg Tab Take 500 mg by mouth 4 (four) times daily as needed.      citalopram (CELEXA) 40 MG tablet Take 1 tablet (40 mg total) by mouth once daily. 90 tablet 3    efinaconazole (JUBLIA) 10 % Reva Apply 1 drop topically once daily. Apply to affected  toenail(s) once daily for 48 weeks- ensure complete coverage of the toenail, the toenail folds, toenail bed, surrounding skin, and the undersurface of the toenail plate 4 mL 3    gabapentin (NEURONTIN) 300 MG capsule Take 300 mg by mouth every evening.      hydrOXYzine pamoate (VISTARIL) 25 MG Cap Take 1 capsule (25 mg total) by mouth nightly as needed (anxiety). 30 capsule 0    rosuvastatin (CRESTOR) 10 MG tablet Take 1 tablet (10 mg total) by mouth every evening. 90 tablet 2     No current facility-administered medications for this visit.

## 2025-03-25 ENCOUNTER — RESULTS FOLLOW-UP (OUTPATIENT)
Dept: PRIMARY CARE CLINIC | Facility: CLINIC | Age: 65
End: 2025-03-25

## 2025-03-25 ENCOUNTER — HOSPITAL ENCOUNTER (OUTPATIENT)
Dept: RADIOLOGY | Facility: OTHER | Age: 65
Discharge: HOME | End: 2025-03-25
Attending: STUDENT IN AN ORGANIZED HEALTH CARE EDUCATION/TRAINING PROGRAM
Payer: MEDICAID

## 2025-03-25 DIAGNOSIS — E04.2 MULTIPLE THYROID NODULES: ICD-10-CM

## 2025-03-25 DIAGNOSIS — E04.2 MULTIPLE THYROID NODULES: Primary | ICD-10-CM

## 2025-03-25 PROCEDURE — 76536 US EXAM OF HEAD AND NECK: CPT | Mod: TC

## 2025-03-25 PROCEDURE — 76536 US EXAM OF HEAD AND NECK: CPT | Mod: 26,,, | Performed by: RADIOLOGY

## 2025-04-15 ENCOUNTER — CLINICAL SUPPORT (OUTPATIENT)
Dept: ENDOSCOPY | Facility: HOSPITAL | Age: 65
End: 2025-04-15
Payer: MEDICAID

## 2025-04-15 ENCOUNTER — TELEPHONE (OUTPATIENT)
Dept: ENDOSCOPY | Facility: HOSPITAL | Age: 65
End: 2025-04-15

## 2025-04-15 VITALS — WEIGHT: 142 LBS | BODY MASS INDEX: 22.24 KG/M2

## 2025-04-15 DIAGNOSIS — Z86.0100 HISTORY OF COLON POLYPS: ICD-10-CM

## 2025-04-15 DIAGNOSIS — Z12.12 ENCOUNTER FOR COLORECTAL CANCER SCREENING: ICD-10-CM

## 2025-04-15 DIAGNOSIS — Z12.11 ENCOUNTER FOR COLORECTAL CANCER SCREENING: ICD-10-CM

## 2025-04-15 RX ORDER — SOD SULF/POT CHLORIDE/MAG SULF 1.479 G
12 TABLET ORAL DAILY
Qty: 24 TABLET | Refills: 0 | Status: SHIPPED | OUTPATIENT
Start: 2025-04-15

## 2025-04-15 NOTE — TELEPHONE ENCOUNTER
Referral for procedure from PAT appointment      Spoke to patient to schedule procedure(s) Colonoscopy       Physician to perform procedure(s) Dr. JOSSELYN Fitzgerald  Date of Procedure (s) 06/09/25  Arrival Time 08:00 AM  Time of Procedure(s) 09:00 AM   Location of Procedure(s) [facility] [address] Floor  Type of Rx Prep sent to patient: Sutab  Instructions provided to patient via [facility]    Patient was informed on the following information and verbalized understanding. Screening questionnaire reviewed with patient and complete. If procedure requires anesthesia, a responsible adult needs to be present to accompany the patient home, patient cannot drive after receiving anesthesia. Appointment details are tentative, especially check-in time. Patient will receive a prep-op call 7 days prior to confirm check-in time for procedure. If applicable the patient should contact their pharmacy to verify Rx for procedure prep is ready for pick-up. Patient was advised to call the scheduling department at [phone] if pharmacy states no Rx is available. Patient was advised to call the endoscopy scheduling department if any questions or concerns arise.      [facility] Scheduling Department

## 2025-05-12 DIAGNOSIS — Z12.12 ENCOUNTER FOR COLORECTAL CANCER SCREENING: ICD-10-CM

## 2025-05-12 DIAGNOSIS — Z12.11 ENCOUNTER FOR COLORECTAL CANCER SCREENING: ICD-10-CM

## 2025-05-12 RX ORDER — SOD SULF/POT CHLORIDE/MAG SULF 1.479 G
12 TABLET ORAL DAILY
Qty: 24 TABLET | Refills: 0 | Status: SHIPPED | OUTPATIENT
Start: 2025-05-12

## 2025-05-14 ENCOUNTER — OFFICE VISIT (OUTPATIENT)
Dept: OBSTETRICS AND GYNECOLOGY | Facility: CLINIC | Age: 65
End: 2025-05-14
Payer: MEDICAID

## 2025-05-14 ENCOUNTER — RESULTS FOLLOW-UP (OUTPATIENT)
Dept: PRIMARY CARE CLINIC | Facility: CLINIC | Age: 65
End: 2025-05-14

## 2025-05-14 ENCOUNTER — HOSPITAL ENCOUNTER (OUTPATIENT)
Dept: RADIOLOGY | Facility: OTHER | Age: 65
Discharge: HOME | End: 2025-05-14
Attending: STUDENT IN AN ORGANIZED HEALTH CARE EDUCATION/TRAINING PROGRAM
Payer: MEDICAID

## 2025-05-14 VITALS
DIASTOLIC BLOOD PRESSURE: 64 MMHG | SYSTOLIC BLOOD PRESSURE: 98 MMHG | BODY MASS INDEX: 22.63 KG/M2 | HEIGHT: 67 IN | WEIGHT: 144.19 LBS

## 2025-05-14 DIAGNOSIS — M81.6 LOCALIZED OSTEOPOROSIS WITHOUT CURRENT PATHOLOGICAL FRACTURE: ICD-10-CM

## 2025-05-14 DIAGNOSIS — N95.2 VAGINAL ATROPHY: ICD-10-CM

## 2025-05-14 DIAGNOSIS — Z01.419 WELL WOMAN EXAM WITH ROUTINE GYNECOLOGICAL EXAM: Primary | ICD-10-CM

## 2025-05-14 DIAGNOSIS — Z00.00 ANNUAL PHYSICAL EXAM: ICD-10-CM

## 2025-05-14 DIAGNOSIS — Z12.31 ENCOUNTER FOR SCREENING MAMMOGRAM FOR BREAST CANCER: ICD-10-CM

## 2025-05-14 DIAGNOSIS — R10.2 PELVIC PRESSURE IN FEMALE: ICD-10-CM

## 2025-05-14 PROCEDURE — 77063 BREAST TOMOSYNTHESIS BI: CPT | Mod: 26,,, | Performed by: RADIOLOGY

## 2025-05-14 PROCEDURE — 99213 OFFICE O/P EST LOW 20 MIN: CPT | Mod: PBBFAC | Performed by: STUDENT IN AN ORGANIZED HEALTH CARE EDUCATION/TRAINING PROGRAM

## 2025-05-14 PROCEDURE — 77067 SCR MAMMO BI INCL CAD: CPT | Mod: 26,,, | Performed by: RADIOLOGY

## 2025-05-14 PROCEDURE — 77063 BREAST TOMOSYNTHESIS BI: CPT | Mod: TC

## 2025-05-14 PROCEDURE — 87624 HPV HI-RISK TYP POOLED RSLT: CPT | Performed by: STUDENT IN AN ORGANIZED HEALTH CARE EDUCATION/TRAINING PROGRAM

## 2025-05-14 PROCEDURE — 99999 PR PBB SHADOW E&M-EST. PATIENT-LVL III: CPT | Mod: PBBFAC,,, | Performed by: STUDENT IN AN ORGANIZED HEALTH CARE EDUCATION/TRAINING PROGRAM

## 2025-05-14 RX ORDER — ESTRADIOL 4 UG/1
4 INSERT VAGINAL DAILY
Qty: 1 EACH | Refills: 0 | Status: SHIPPED | OUTPATIENT
Start: 2025-05-14 | End: 2025-05-14

## 2025-05-14 RX ORDER — ESTRADIOL 4 UG/1
4 INSERT VAGINAL DAILY
Qty: 18 EACH | Refills: 0 | Status: SHIPPED | OUTPATIENT
Start: 2025-05-14 | End: 2025-06-03

## 2025-05-14 RX ORDER — ESTRADIOL 4 UG/1
4 INSERT VAGINAL
Qty: 8 EACH | Refills: 11 | Status: SHIPPED | OUTPATIENT
Start: 2025-05-15

## 2025-05-14 NOTE — PROGRESS NOTES
History & Physical  Gynecology      SUBJECTIVE:     Chief Complaint: Annual Exam       History of Present Illness:  Annual Exam-Postmenopausal  Patient presents for annual exam. She has complaints of chronic pelvic pressure. She has been seen by Urogyn in the past with recommendations for pelvic floor PT. In office cysto notable for moderate trabeculations consistent with OAB. Patient plans to start this soon.  She reports history of salpingectomy and left oophorectomy in her 20s due to PID.  TUVS on 10/2024 demonstrated small uterus 3.5 x 1.6 x 2.5 cm without any masses and 2mm stripe.  Patient denies post-menopausal vaginal bleeding.     She is not sexually active. She is not taking hormone replacement therapy.  She participates in regular exercise: yes.  She does not smoke.     GYN screening history: last pap:  - insufficient  Mammogram history: 2024 BIRADS 1, scheduled  Colonoscopy history: scheduled  Dexa history: osetoporosis 2023    FH:   Breast cancer: denies  Colon cancer: denies  Ovarian cancer: denies    Review of patient's allergies indicates:   Allergen Reactions    Penicillins Itching and Nausea Only    Cephalexin Hives, Itching, Rash and Swelling    Meperidine Itching and Rash       Past Medical History:   Diagnosis Date    DDD (degenerative disc disease), cervical     Hemangioma of liver     History of thyroid nodule     Nicotine dependence     Osteoporosis      Past Surgical History:   Procedure Laterality Date    OOPHORECTOMY Left     salpingectomy Bilateral      OB History          0    Para   0    Term   0       0    AB   0    Living   0         SAB   0    IAB   0    Ectopic   0    Multiple   0    Live Births   0               Family History   Problem Relation Name Age of Onset    Alcohol abuse Mother      Coronary artery disease Father      Hypertension Father      No Known Problems Sister      No Known Problems Sister      No Known Problems Brother      No Known Problems  Brother      No Known Problems Brother      No Known Problems Brother       Social History[1]    Current Outpatient Medications   Medication Sig    chlorzoxazone (PARAFON FORTE) 500 mg Tab Take 500 mg by mouth 4 (four) times daily as needed.    citalopram (CELEXA) 40 MG tablet Take 1 tablet (40 mg total) by mouth once daily.    efinaconazole (JUBLIA) 10 % Reva Apply 1 drop topically once daily. Apply to affected toenail(s) once daily for 48 weeks- ensure complete coverage of the toenail, the toenail folds, toenail bed, surrounding skin, and the undersurface of the toenail plate    gabapentin (NEURONTIN) 300 MG capsule Take 300 mg by mouth every evening.    oxyCODONE-acetaminophen (PERCOCET) 7.5-325 mg per tablet Take 1 tablet by mouth 2 (two) times daily as needed.    rosuvastatin (CRESTOR) 10 MG tablet Take 1 tablet (10 mg total) by mouth every evening.    sod sulf-pot chloride-mag sulf (SUTAB) 1.479-0.188- 0.225 gram tablet Take 12 tablets by mouth once daily. Please follow Endoscopy 's instructions. Please apply coupon code. Please apply coupon code. BIN: [id number], PCN: [id number], GROUP: [id number], MEMBER ID: [id number]    [START ON 5/15/2025] estradioL (IMVEXXY MAINTENANCE PACK) 4 mcg Inst Place 4 mcg vaginally twice a week. 1 insert twice weekly, every three to four days (for example, Monday and Thursday).    estradioL (IMVEXXY STARTER PACK) 4 mcg InPk Place 4 mcg vaginally once daily. for 14 days     No current facility-administered medications for this visit.       Review of Systems:  Review of Systems   Constitutional:  Negative for chills and fever.   Respiratory:  Negative for shortness of breath.    Cardiovascular:  Negative for chest pain.   Gastrointestinal:  Negative for abdominal pain, nausea and vomiting.   Endocrine: Negative for hot flashes.   Genitourinary:  Positive for vaginal dryness. Negative for dysuria and postmenopausal bleeding.   Integumentary:  Negative for breast mass, nipple  discharge and breast skin changes.   Neurological:  Negative for headaches.   Hematological:  Does not bruise/bleed easily.   Psychiatric/Behavioral:  Negative for depression.    Breast: Negative for mass, mastodynia, nipple discharge and skin changes       OBJECTIVE:     Physical Exam:  Physical Exam  Constitutional:       Appearance: Normal appearance.   HENT:      Head: Normocephalic and atraumatic.   Eyes:      Conjunctiva/sclera: Conjunctivae normal.      Pupils: Pupils are equal, round, and reactive to light.   Cardiovascular:      Rate and Rhythm: Normal rate and regular rhythm.   Pulmonary:      Effort: Pulmonary effort is normal. No respiratory distress.   Chest:   Breasts:     Right: No inverted nipple, mass, nipple discharge, skin change or tenderness.      Left: No inverted nipple, mass, nipple discharge, skin change or tenderness.   Abdominal:      General: Abdomen is flat. There is no distension.      Palpations: Abdomen is soft.      Tenderness: There is no abdominal tenderness.   Genitourinary:     General: Normal vulva.      Vagina: No vaginal discharge, tenderness or bleeding.      Cervix: No cervical motion tenderness or friability.      Uterus: Not enlarged and not tender.       Comments: Vaginal introitus very narrow. Ped speculum used. Vaginal atrophy noted  Musculoskeletal:         General: Normal range of motion.      Cervical back: Normal range of motion.   Neurological:      Mental Status: She is alert.   Psychiatric:         Mood and Affect: Mood normal.         Thought Content: Thought content normal.           ASSESSMENT:       ICD-10-CM ICD-9-CM    1. Well woman exam with routine gynecological exam  Z01.419 V72.31 Liquid-Based Pap Smear, Screening      2. Annual physical exam  Z00.00 V70.0 Ambulatory referral/consult to Obstetrics / Gynecology      3. Pelvic pressure in female  R10.2 625.8 Ambulatory referral/consult to Obstetrics / Gynecology      4. Vaginal atrophy  N95.2 627.3  estradioL (IMVEXXY MAINTENANCE PACK) 4 mcg Inst      estradioL (IMVEXXY STARTER PACK) 4 mcg InPk      DISCONTINUED: estradioL (IMVEXXY STARTER PACK) 4 mcg InPk      5. Localized osteoporosis without current pathological fracture  M81.6 733.09 Ambulatory referral/consult to Endocrinology             Plan:      Linda was seen today for annual exam.    Diagnoses and all orders for this visit:    Well woman exam with routine gynecological exam  -     Liquid-Based Pap Smear, Screening    Annual physical exam  -     Ambulatory referral/consult to Obstetrics / Gynecology    Pelvic pressure in female  -     Ambulatory referral/consult to Obstetrics / Gynecology    Vaginal atrophy  -     Discontinue: estradioL (IMVEXXY STARTER PACK) 4 mcg InPk; Place 4 mcg vaginally once daily. for 14 days  -     estradioL (IMVEXXY MAINTENANCE PACK) 4 mcg Inst; Place 4 mcg vaginally twice a week. 1 insert twice weekly, every three to four days (for example, Monday and Thursday).  -     estradioL (IMVEXXY STARTER PACK) 4 mcg InPk; Place 4 mcg vaginally once daily. for 14 days    Localized osteoporosis without current pathological fracture  -     Ambulatory referral/consult to Endocrinology; Future        Orders Placed This Encounter   Procedures    Ambulatory referral/consult to Endocrinology       Well Woman:   - Pap smear: collected, discussed with patient that if sample insufficient will recommend she return for repeat testing 4 months after vaginal estrogen use  - Mammogram: scheduled  - Colonoscopy: scheduled  - Dexa: osteoporosis noted in 2023, discussed referral to endocrine for management, patient agrees  - Immunizations: per PCP  - Labs: reviewed  - Exercise recommended    Follow up in one year for annual, or prn.    Michelle Bennett    Answers submitted by the patient for this visit:  Gynecologic Exam Questionnaire  (Submitted on 5/12/2025)  Chief Complaint: Gynecologic exam  genital itching: No  genital lesions: No  genital odor:  No  genital rash: No  missed menses: No  pelvic pain: No  vaginal bleeding: No  vaginal discharge: No  Pregnant now?: No  abdominal pain: No  anorexia: No  back pain: No  chills: No  constipation: No  diarrhea: No  discolored urine: No  dysuria: No  fever: No  flank pain: No  frequency: No  headaches: No  hematuria: No  nausea: No  painful intercourse: No  rash: No  urgency: No  vomiting: No  Please select the characteristics of your discharge: : normal  Passing clots?: No  Sexual activity: not sexually active  Partner with STD symptoms: no  Birth control: abstinence  Menstrual history: postmenopausal  STD: Yes  abdominal surgery: Yes   section: No  Ectopic pregnancy: No  Endometriosis: No  herpes simplex: Yes  gynecological surgery: Yes  menorrhagia: Yes  metrorrhagia: No  miscarriage: No  ovarian cysts: Yes  perineal abscess: No  PID: Yes  terminated pregnancy: No  vaginosis: No         [1]   Social History  Tobacco Use    Smoking status: Every Day     Types: Vaping with nicotine    Smokeless tobacco: Never   Substance Use Topics    Alcohol use: Yes     Comment: Occasional now 1 x per week, previosuly 7 per week    Drug use: Not Currently     Types: Cocaine, Marijuana, Methamphetamines, Barbituates, Hashish, LSD, MDMA (Ecstacy), PCP, Psilocybin     Comment: occasional marijuana

## 2025-05-14 NOTE — PATIENT INSTRUCTIONS
Start therapy with the IMVEXXY 4 mcg dosage strength administered intravaginally.  Insert with the smaller end up for a depth of about two inches into the vaginal canal.   Administer 1 insert daily at approximately the same time for 2 weeks, followed by 1 insert twice weekly, every three to four days (for example, Monday and Thursday).

## 2025-05-20 ENCOUNTER — RESULTS FOLLOW-UP (OUTPATIENT)
Dept: OBSTETRICS AND GYNECOLOGY | Facility: CLINIC | Age: 65
End: 2025-05-20

## 2025-06-05 ENCOUNTER — PATIENT MESSAGE (OUTPATIENT)
Dept: PRIMARY CARE CLINIC | Facility: CLINIC | Age: 65
End: 2025-06-05
Payer: MEDICAID

## 2025-06-05 DIAGNOSIS — F41.9 ANXIETY AND DEPRESSION: ICD-10-CM

## 2025-06-05 DIAGNOSIS — F32.A ANXIETY AND DEPRESSION: ICD-10-CM

## 2025-06-06 RX ORDER — CITALOPRAM 20 MG/1
20 TABLET ORAL DAILY
Qty: 90 TABLET | Refills: 3 | Status: SHIPPED | OUTPATIENT
Start: 2025-06-06

## 2025-06-09 ENCOUNTER — ANESTHESIA (OUTPATIENT)
Dept: ENDOSCOPY | Facility: HOSPITAL | Age: 65
End: 2025-06-09
Payer: MEDICAID

## 2025-06-09 ENCOUNTER — HOSPITAL ENCOUNTER (OUTPATIENT)
Facility: HOSPITAL | Age: 65
Discharge: HOME | End: 2025-06-09
Attending: INTERNAL MEDICINE | Admitting: INTERNAL MEDICINE
Payer: MEDICAID

## 2025-06-09 ENCOUNTER — ANESTHESIA EVENT (OUTPATIENT)
Dept: ENDOSCOPY | Facility: HOSPITAL | Age: 65
End: 2025-06-09
Payer: MEDICAID

## 2025-06-09 VITALS
RESPIRATION RATE: 18 BRPM | OXYGEN SATURATION: 98 % | DIASTOLIC BLOOD PRESSURE: 60 MMHG | SYSTOLIC BLOOD PRESSURE: 125 MMHG | HEART RATE: 53 BPM | HEIGHT: 67 IN | TEMPERATURE: 98 F | BODY MASS INDEX: 22.43 KG/M2 | WEIGHT: 142.88 LBS

## 2025-06-09 DIAGNOSIS — Z12.11 SCREENING FOR COLON CANCER: Primary | ICD-10-CM

## 2025-06-09 PROCEDURE — 37000009 HC ANESTHESIA EA ADD 15 MINS: Performed by: INTERNAL MEDICINE

## 2025-06-09 PROCEDURE — 45378 DIAGNOSTIC COLONOSCOPY: CPT | Mod: ,,, | Performed by: INTERNAL MEDICINE

## 2025-06-09 PROCEDURE — 25000003 PHARM REV CODE 250: Performed by: NURSE ANESTHETIST, CERTIFIED REGISTERED

## 2025-06-09 PROCEDURE — 63600175 PHARM REV CODE 636 W HCPCS: Performed by: NURSE ANESTHETIST, CERTIFIED REGISTERED

## 2025-06-09 PROCEDURE — 37000008 HC ANESTHESIA 1ST 15 MINUTES: Performed by: INTERNAL MEDICINE

## 2025-06-09 PROCEDURE — 45378 DIAGNOSTIC COLONOSCOPY: CPT | Performed by: INTERNAL MEDICINE

## 2025-06-09 RX ORDER — PROPOFOL 10 MG/ML
VIAL (ML) INTRAVENOUS
Status: DISCONTINUED | OUTPATIENT
Start: 2025-06-09 | End: 2025-06-09

## 2025-06-09 RX ORDER — LIDOCAINE HYDROCHLORIDE 20 MG/ML
INJECTION INTRAVENOUS
Status: DISCONTINUED | OUTPATIENT
Start: 2025-06-09 | End: 2025-06-09

## 2025-06-09 RX ADMIN — SODIUM CHLORIDE: 9 INJECTION, SOLUTION INTRAVENOUS at 08:06

## 2025-06-09 RX ADMIN — PROPOFOL 90 MG: 10 INJECTION, EMULSION INTRAVENOUS at 08:06

## 2025-06-09 RX ADMIN — PROPOFOL 225 MCG/KG/MIN: 10 INJECTION, EMULSION INTRAVENOUS at 08:06

## 2025-06-09 RX ADMIN — LIDOCAINE HYDROCHLORIDE 100 MG: 20 INJECTION INTRAVENOUS at 08:06

## 2025-06-09 NOTE — ANESTHESIA PREPROCEDURE EVALUATION
06/09/2025  Linda Kahn is a 64 y.o., female.    Past Medical History:   Diagnosis Date    DDD (degenerative disc disease), cervical     Hemangioma of liver     History of thyroid nodule     Nicotine dependence     Osteoporosis       Past Surgical History:   Procedure Laterality Date    OOPHORECTOMY Left     salpingectomy Bilateral     Problem List[1]       Pre-op Assessment    I have reviewed the Patient Summary Reports.     I have reviewed the Nursing Notes. I have reviewed the NPO Status.   I have reviewed the Medications.     Review of Systems  Anesthesia Hx:  No problems with previous Anesthesia             Denies Family Hx of Anesthesia complications.    Denies Personal Hx of Anesthesia complications.                    Hematology/Oncology:  Hematology Normal                                     Cardiovascular:  Cardiovascular Normal                                              Renal/:  Chronic Renal Disease                Hepatic/GI:      Liver Disease,               Musculoskeletal:  Musculoskeletal Normal                Neurological:      Headaches                                 Dermatological:  Skin Normal    Psych:  Psychiatric History                  Physical Exam  General: Cooperative, Alert and Oriented    Airway:  Mallampati: II   Mouth Opening: Normal  TM Distance: Normal  Tongue: Normal  Neck ROM: Normal ROM    Dental:  Intact        Anesthesia Plan  Type of Anesthesia, risks & benefits discussed:    Anesthesia Type: Gen Natural Airway  Intra-op Monitoring Plan: Standard ASA Monitors  Induction:  IV  Informed Consent: Informed consent signed with the Patient and all parties understand the risks and agree with anesthesia plan.  All questions answered.   ASA Score: 2  Day of Surgery Review of History & Physical: H&P Update referred to the surgeon/provider.I have interviewed and  examined the patient. I have reviewed the patient's H&P dated: There are no significant changes.     Ready For Surgery From Anesthesia Perspective.     .           [1]   Patient Active Problem List  Diagnosis    Menopause    Severe episode of recurrent major depressive disorder, without psychotic features    Chronic tension-type headache, not intractable    Anxiety and depression    Vaping nicotine dependence, non-tobacco product    Liver lesion, right lobe    Localized osteoporosis without current pathological fracture    Degenerative cervical disc    Liver hemangioma    Pelvic pressure in female    Liver cyst    Multiple thyroid nodules    Numerous moles    History of PID    Constipation    Renal cyst

## 2025-06-09 NOTE — PROVATION PATIENT INSTRUCTIONS
Discharge Summary/Instructions after an Endoscopic Procedure  Patient Name: Linda Kahn  Patient MRN: [id number]  Patient YOB: 1960 Monday, June 9, 2025  Tre Fitzgerald MD  Dear patient,  As a result of recent federal legislation (The Federal Cures Act), you may   receive lab or pathology results from your procedure in your [facility]   account before your physician is able to contact you. Your physician or   their representative will relay the results to you with their   recommendations at their soonest availability.  Thank you,  RESTRICTIONS:  During your procedure today, you received medications for sedation.  These   medications may affect your judgment, balance and coordination.  Therefore,   for 24 hours, you have the following restrictions:   - DO NOT drive a car, operate machinery, make legal/financial decisions,   sign important papers or drink alcohol.    ACTIVITY:  Today: no heavy lifting, straining or running due to procedural   sedation/anesthesia.  The following day: return to full activity including work.  DIET:  Eat and drink normally unless instructed otherwise.     TREATMENT FOR COMMON SIDE EFFECTS:  - Mild abdominal pain, nausea, belching, bloating or excessive gas:  rest,   eat lightly and use a heating pad.  - Sore Throat: treat with throat lozenges and/or gargle with warm salt   water.  - Because air was used during the procedure, expelling large amounts of air   from your rectum or belching is normal.  - If a bowel prep was taken, you may not have a bowel movement for 1-3 days.    This is normal.  SYMPTOMS TO WATCH FOR AND REPORT TO YOUR PHYSICIAN:  1. Abdominal pain or bloating, other than gas cramps.  2. Chest pain.  3. Back pain.  4. Signs of infection such as: chills or fever occurring within 24 hours   after the procedure.  5. Rectal bleeding, which would show as bright red, maroon, or black stools.   (A tablespoon of blood from the rectum is not serious, especially if    hemorrhoids are present.)  6. Vomiting.  7. Weakness or dizziness.  GO DIRECTLY TO THE NEAREST EMERGENCY ROOM IF YOU HAVE ANY OF THE FOLLOWING:      Difficulty breathing              Chills and/or fever over 101 F   Persistent vomiting and/or vomiting blood   Severe abdominal pain   Severe chest pain   Black, tarry stools   Bleeding- more than one tablespoon   Any other symptom or condition that you feel may need urgent attention  Your doctor recommends these additional instructions:  If any biopsies were taken, your doctors clinic will contact you in 1 to 2   weeks with any results.  - Discharge patient to home.   - Resume previous diet.   - Continue present medications.   - Repeat colonoscopy in 10 years for screening purposes.   - Return to referring physician.  For questions, problems or results please call your physician - Tre Fitzgerald MD at Work:  [phone].  [facility], EMERGENCY ROOM PHONE NUMBER: [phone]  IF A COMPLICATION OR EMERGENCY SITUATION ARISES AND YOU ARE UNABLE TO REACH   YOUR PHYSICIAN - GO DIRECTLY TO THE EMERGENCY ROOM.  Tre Fitzgerald MD  6/9/2025 9:12:50 AM  This report has been verified and signed electronically.  Dear patient,  As a result of recent federal legislation (The Federal Cures Act), you may   receive lab or pathology results from your procedure in your [facility]   account before your physician is able to contact you. Your physician or   their representative will relay the results to you with their   recommendations at their soonest availability.  Thank you,  PROVATION

## 2025-06-09 NOTE — TRANSFER OF CARE
"Anesthesia Transfer of Care Note    Patient: Linda Kahn    Procedure(s) Performed: Procedure(s) (LRB):  COLONOSCOPY, SCREENING, HIGH RISK PATIENT (N/A)    Patient location: PACU    Anesthesia Type: general    Transport from OR: Transported from OR on room air with adequate spontaneous ventilation    Post pain: adequate analgesia    Post assessment: no apparent anesthetic complications    Post vital signs: stable    Level of consciousness: responds to stimulation    Nausea/Vomiting: no nausea/vomiting    Complications: none    Transfer of care protocol was followed    Last vitals: Visit Vitals  /72 (BP Location: Left arm, Patient Position: Lying)   Pulse (!) 59   Temp 36.6 °C (97.9 °F) (Temporal)   Resp 18   Ht 5' 7" (1.702 m)   Wt 64.8 kg (142 lb 13.7 oz)   SpO2 100%   Breastfeeding No   BMI 22.37 kg/m²     "

## 2025-06-09 NOTE — H&P
Short Stay Endoscopy History and Physical    PCP - Rosa Martin MD    Procedure - Colonoscopy  ASA - per anesthesia  Mallampati - per anesthesia  Plan of anesthesia - MAC    HPI:  This is a 64 y.o. female here for evaluation of : asymptomatic screening exam    ROS:  Constitutional: No fevers, chills  CV: No chest pain  Pulm: No cough  Ophtho: No vision changes  GI: see HPI  Derm: No rash    Medical History:  has a past medical history of DDD (degenerative disc disease), cervical, Hemangioma of liver, History of thyroid nodule, Nicotine dependence, and Osteoporosis.    Surgical History:  has a past surgical history that includes Oophorectomy (Left) and salpingectomy (Bilateral).    Family History: family history includes Alcohol abuse in her mother; Coronary artery disease in her father; Hypertension in her father; No Known Problems in her brother, brother, brother, brother, sister, and sister.. Otherwise no colon cancer, inflammatory bowel disease, or GI malignancies.    Social History:  reports that she has been smoking vaping with nicotine. She has never used smokeless tobacco. She reports current alcohol use. She reports that she does not currently use drugs after having used the following drugs: Cocaine, Marijuana, Methamphetamines, Barbituates, Hashish, LSD, MDMA (Ecstacy), PCP, and Psilocybin.    Review of patient's allergies indicates:   Allergen Reactions    Penicillins Itching and Nausea Only    Cephalexin Hives, Itching, Rash and Swelling    Meperidine Itching and Rash       Medications:   Prescriptions Prior to Admission[1]      Vital Signs: There were no vitals filed for this visit.    General Appearance: Well appearing in no acute distress  Eyes:    No scleral icterus  ENT: atraumatic  Abdomen: Soft, nondistended  Extremities: no tenderness  Skin: normal color    Labs:  Lab Results   Component Value Date    WBC 6.46 10/21/2024    HGB 13.1 10/21/2024    HCT 39.0 10/21/2024     10/21/2024     CHOL 299 (H) 10/21/2024    TRIG 108 10/21/2024    HDL 73 10/21/2024    ALT 12 11/26/2024    AST 18 11/26/2024     11/26/2024    K 4.2 11/26/2024     11/26/2024    CREATININE 0.7 11/26/2024    BUN 6 (L) 11/26/2024    CO2 24 11/26/2024    TSH 0.632 10/21/2024    HGBA1C 5.0 10/21/2024       I have explained the risks and benefits of endoscopy procedures to the patient/their POA including but not limited to bleeding, perforation, infection, and death.  The patient/their POA was asked if they understand and allowed to ask any further questions to their satisfaction.    Haim Ellis MD         [1]   Medications Prior to Admission   Medication Sig Dispense Refill Last Dose/Taking    chlorzoxazone (PARAFON FORTE) 500 mg Tab Take 500 mg by mouth 4 (four) times daily as needed.       citalopram (CELEXA) 20 MG tablet Take 1 tablet (20 mg total) by mouth once daily. 90 tablet 3     efinaconazole (JUBLIA) 10 % Reva Apply 1 drop topically once daily. Apply to affected toenail(s) once daily for 48 weeks- ensure complete coverage of the toenail, the toenail folds, toenail bed, surrounding skin, and the undersurface of the toenail plate 4 mL 3     estradioL (IMVEXXY MAINTENANCE PACK) 4 mcg Inst Place 4 mcg vaginally  twice weekly, (every three to four days (for example, Monday and Thursday)) 8 each 11     gabapentin (NEURONTIN) 300 MG capsule Take 300 mg by mouth every evening.       oxyCODONE-acetaminophen (PERCOCET) 7.5-325 mg per tablet Take 1 tablet by mouth 2 (two) times daily as needed.       rosuvastatin (CRESTOR) 10 MG tablet Take 1 tablet (10 mg total) by mouth every evening. 90 tablet 2     sod sulf-pot chloride-mag sulf (SUTAB) 1.479-0.188- 0.225 gram tablet Take 12 tablets by mouth once daily. Please follow Endoscopy 's instructions. Please apply coupon code. Please apply coupon code. BIN: [id number], PCN: [id number], GROUP: [id number], MEMBER ID: [id number] 24 tablet 0

## 2025-06-10 NOTE — ANESTHESIA POSTPROCEDURE EVALUATION
Anesthesia Post Evaluation    Patient: Linda Kahn    Procedure(s) Performed: Procedure(s) (LRB):  COLONOSCOPY, SCREENING, HIGH RISK PATIENT (N/A)    Final Anesthesia Type: general      Patient location during evaluation: GI PACU  Patient participation: Yes- Able to Participate  Level of consciousness: awake  Post-procedure vital signs: reviewed and stable  Pain management: adequate  Airway patency: patent    PONV status at discharge: No PONV  Anesthetic complications: no      Cardiovascular status: blood pressure returned to baseline  Respiratory status: unassisted, spontaneous ventilation and room air                Vitals Value Taken Time   /60 06/09/25 09:36   Temp 36.4 °C (97.5 °F) 06/09/25 09:06   Pulse 53 06/09/25 09:36   Resp 18 06/09/25 09:36   SpO2 98 % 06/09/25 09:36         Event Time   Out of Recovery 09:55:19         Pain/Ehsan Score: Ehsan Score: 10 (6/9/2025  9:36 AM)

## 2025-07-11 ENCOUNTER — ON-DEMAND VIRTUAL (OUTPATIENT)
Dept: URGENT CARE | Facility: CLINIC | Age: 65
End: 2025-07-11
Payer: MEDICAID

## 2025-07-11 ENCOUNTER — OFFICE VISIT (OUTPATIENT)
Dept: URGENT CARE | Facility: CLINIC | Age: 65
End: 2025-07-11

## 2025-07-11 VITALS
DIASTOLIC BLOOD PRESSURE: 78 MMHG | BODY MASS INDEX: 22.43 KG/M2 | SYSTOLIC BLOOD PRESSURE: 126 MMHG | WEIGHT: 142.88 LBS | HEART RATE: 56 BPM | OXYGEN SATURATION: 97 % | HEIGHT: 67 IN | TEMPERATURE: 99 F | RESPIRATION RATE: 17 BRPM

## 2025-07-11 DIAGNOSIS — M25.529 ELBOW PAIN, UNSPECIFIED LATERALITY: ICD-10-CM

## 2025-07-11 DIAGNOSIS — S80.812A ABRASION OF LEFT LOWER EXTREMITY, INITIAL ENCOUNTER: ICD-10-CM

## 2025-07-11 DIAGNOSIS — S89.92XA INJURY OF LEFT KNEE, INITIAL ENCOUNTER: ICD-10-CM
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
## 2025-07-11 DIAGNOSIS — S60.211A CONTUSION OF RIGHT WRIST, INITIAL ENCOUNTER: ICD-10-CM

## 2025-07-11 DIAGNOSIS — S59.901A INJURY OF RIGHT ELBOW, INITIAL ENCOUNTER: ICD-10-CM

## 2025-07-11 DIAGNOSIS — S42.401A CLOSED FRACTURE OF RIGHT ELBOW, INITIAL ENCOUNTER: Primary | ICD-10-CM

## 2025-07-11 DIAGNOSIS — S93.402A SPRAIN OF LEFT ANKLE, UNSPECIFIED LIGAMENT, INITIAL ENCOUNTER: ICD-10-CM

## 2025-07-11 PROCEDURE — 73080 X-RAY EXAM OF ELBOW: CPT | Mod: TIER,RT,S$GLB, | Performed by: RADIOLOGY

## 2025-07-11 RX ORDER — IBUPROFEN 400 MG/1
400 TABLET, FILM COATED ORAL
Status: COMPLETED | OUTPATIENT
Start: 2025-07-11 | End: 2025-07-11

## 2025-07-11 RX ORDER — IBUPROFEN 800 MG/1
800 TABLET, FILM COATED ORAL 3 TIMES DAILY PRN
Qty: 30 TABLET | Refills: 0 | Status: SHIPPED | OUTPATIENT
Start: 2025-07-11 | End: 2025-07-21

## 2025-07-11 RX ORDER — MUPIROCIN 20 MG/G
OINTMENT TOPICAL DAILY
Qty: 15 G | Refills: 0 | Status: SHIPPED | OUTPATIENT
Start: 2025-07-11

## 2025-07-11 RX ADMIN — IBUPROFEN 400 MG: 400 TABLET, FILM COATED ORAL at 05:07

## 2025-07-11 NOTE — PROGRESS NOTES
Subjective:      Patient ID: Linda Kahn is a 65 y.o. female.    Vitals:  vitals were not taken for this visit.     Chief Complaint: Fall      Visit Type: TELE AUDIOVISUAL - This visit was conducted virtually based on  subjective information and limited objective exam    Present with the patient at the time of consultation: TELEMED PRESENT WITH PATIENT: None  LOCATION OF PATIENT [place]  Two patient identifiers used to verify patient- saying out date of birth and full name.       Past Medical History:   Diagnosis Date    DDD (degenerative disc disease), cervical     Hemangioma of liver     History of thyroid nodule     Nicotine dependence     Osteoporosis      Past Surgical History:   Procedure Laterality Date    COLONOSCOPY, SCREENING, HIGH RISK PATIENT N/A 6/9/2025    Procedure: COLONOSCOPY, SCREENING, HIGH RISK PATIENT;  Surgeon: Tre Fitzgerald MD;  Location: [address]);  Service: Endoscopy;  Laterality: N/A;  4/15 ref by Dr. RAMU Martin, carlos Morillo. anish  5/30 precall attempted. lvm. kw  6/3 pre-call complete; Patient wants to keep her appt. currently, but needs  to reach out to her for possible reschedule on a different date; MB  6/6 pt will keep    OOPHORECTOMY Left     salpingectomy Bilateral      Review of patient's allergies indicates:   Allergen Reactions    Penicillins Itching and Nausea Only    Cephalexin Hives, Itching, Rash and Swelling    Meperidine Itching and Rash     Medications Ordered Prior to Encounter[1]  Family History   Problem Relation Name Age of Onset    Alcohol abuse Mother      Coronary artery disease Father      Hypertension Father      No Known Problems Sister      No Known Problems Sister      No Known Problems Brother      No Known Problems Brother      No Known Problems Brother      No Known Problems Brother             Ohs Peq Odvv Intake    7/11/2025  2:17 PM CDT - Filed by Patient   What is your current physical address in the event of a medical  emergency? [address]   Are you able to take your vital signs? No   Please attach any relevant images or files    Is your employer contracted with [facility]? No         64 yo female with c/o fall. She states [place] [address] and she slipped on sidewalk. She states she went down hard. She states she went to urgent care on [address] but they were closed for lunch. She states she went home an applied ice. She states she has a goose egg on left knee and twisted left ankle and hurt her right wrist. She states some abrasions. She states she is concerned about right palm has a bright purple color. She states someone helped her off the ground. She states elbow is killing her. She states shoulder feels fine,         Constitution: Negative.   HENT: Negative.     Cardiovascular: Negative.    Respiratory: Negative.     Gastrointestinal: Negative.    Endocrine: negative.   Genitourinary: Negative.  Negative for frequency and urgency.   Musculoskeletal: Negative.    Skin: Negative.    Allergic/Immunologic: Negative.    Neurological: Negative.    Hematologic/Lymphatic: Negative.    Psychiatric/Behavioral: Negative.          Objective:   The physical exam was conducted virtually.    AAO x 3 ; no acute distress noted; appearance normal; mood and behavior normal; thought process normal  Head- normocephalic  Nose- appears normal, no discharge or erythema  Eyes- pupils appear normal in size, no drainage, no erythema  Ears- normal appearing; no discharge, no erythema  Mouth- appears normal  Oropharynx- no erythema, lesions  Lungs- breathing at a normal rate, no acute distress noted  Heart- no reports of tachycardia, palpitations, chest pain  Abdomen- non distended, non tender reported by patient  Skin- warm and dry, no erythema or edema noted by patient or visualized  Psych- as above; no si/hi      Assessment:     1. Fall, initial encounter    2. Elbow pain, unspecified laterality    3.  Sprain of left ankle, unspecified ligament, initial encounter    4. Injury of left knee, initial encounter    5. Contusion of right wrist, initial encounter        Plan:     Urgent care for xrays and further evaluation      Continue ice to area.   Ibuprofen 600 mg every 8-12 hours as needed for pain  Alternating with tylenol 325 mg -650 mg every 4-6 hours (or Tylenol ES (500mg) 1-2 tablets)  Rest  Compression    Time spent 15 min  Thank you for choosing [facility]!    Our goal in the [facility] is to always provide outstanding medical care. You may receive a survey by mail or e-mail in the next week regarding your experience today. We would greatly appreciate you completing and returning the survey. Your feedback provides us with a way to recognize our staff who provide very good care, and it helps us learn how to improve when your experience was below our aspiration of excellence.         We appreciate you trusting us with your medical care. We hope you feel better soon. We will be happy to take care of you for all of your future medical needs.    You must understand that you've received an Urgent Care treatment only and that you may be released before all your medical problems are known or treated. You, the patient, will arrange for follow up care as instructed.    Follow up with your PCP or specialty clinic as directed in the next 1-2 weeks if not improved or as needed.  You can call [phone] to schedule an appointment with the appropriate provider.    If your condition worsens we recommend that you receive another evaluation in person, with your primary care provider, urgent care or at the emergency room immediately or contact your primary medical clinics after hours call service to discuss your concerns.         Fall, initial encounter    Elbow pain, unspecified laterality    Sprain of left ankle, unspecified ligament, initial encounter    Injury of left knee, initial  encounter    Contusion of right wrist, initial encounter                         [1]   Current Outpatient Medications on File Prior to Visit   Medication Sig Dispense Refill    chlorzoxazone (PARAFON FORTE) 500 mg Tab Take 500 mg by mouth 4 (four) times daily as needed.      citalopram (CELEXA) 20 MG tablet Take 1 tablet (20 mg total) by mouth once daily. 90 tablet 3    efinaconazole (JUBLIA) 10 % Reva Apply 1 drop topically once daily. Apply to affected toenail(s) once daily for 48 weeks- ensure complete coverage of the toenail, the toenail folds, toenail bed, surrounding skin, and the undersurface of the toenail plate 4 mL 3    estradioL (IMVEXXY MAINTENANCE PACK) 4 mcg Inst Place 4 mcg vaginally  twice weekly, (every three to four days (for example, Monday and Thursday)) 8 each 11    gabapentin (NEURONTIN) 300 MG capsule Take 300 mg by mouth every evening.      oxyCODONE-acetaminophen (PERCOCET) 7.5-325 mg per tablet Take 1 tablet by mouth 2 (two) times daily as needed.      rosuvastatin (CRESTOR) 10 MG tablet Take 1 tablet (10 mg total) by mouth every evening. 90 tablet 2    sod sulf-pot chloride-mag sulf (SUTAB) 1.479-0.188- 0.225 gram tablet Take 12 tablets by mouth once daily. Please follow Endoscopy 's instructions. Please apply coupon code. Please apply coupon code. BIN: [id number], PCN: 2001, GROUP: [id number], MEMBER ID: [id number] 24 tablet 0     No current facility-administered medications on file prior to visit.

## 2025-07-11 NOTE — PROGRESS NOTES
"Subjective:      Patient ID: Linda Kahn is a 65 y.o. female.    Vitals:  height is 5' 7" (1.702 m) and weight is 64.8 kg (142 lb 13.7 oz). Her oral temperature is 98.8 °F (37.1 °C). Her blood pressure is 126/78 and her pulse is 56 (abnormal). Her respiration is 17 and oxygen saturation is 97%.     Chief Complaint: Arm Injury    FFS 1  Linda Kahn is a 65 y.o. female c/o R arm, wrist, and elbow pain from a fall on the side walk. Fall occurred about 2 hours ago. Pt also scraped up left lower leg (started to swell). Broke fall with palm and wrist. Most pain is in elbow. Pain radiates up and down from elbow. Mobility in wrist and hand is fine, but will trigger pain in elbow. Pt is okay with paying for imaging. Took half of a left over percocet from a back issue (didn't have any aspirin or advil). Mildly helped pain, doesn't like to take them.      Arm Injury   The pain is present in the right elbow, right wrist and upper right arm. Pertinent negatives include no numbness.       Constitution: Negative for fever.   Musculoskeletal:  Positive for pain, trauma, joint pain, joint swelling and abnormal ROM of joint.   Skin:  Positive for abrasion. Negative for erythema.   Neurological:  Negative for dizziness, light-headedness, headaches, disorientation, altered mental status, loss of consciousness, numbness and tingling.   Psychiatric/Behavioral:  Negative for altered mental status and disorientation.       Objective:     Physical Exam   Constitutional: She is oriented to person, place, and time. She appears well-developed.   HENT:   Head: Normocephalic and atraumatic. Head is without abrasion, without contusion and without laceration.   Ears:   Right Ear: External ear normal.   Left Ear: External ear normal.   Nose: Nose normal.   Mouth/Throat: Oropharynx is clear and moist and mucous membranes are normal.   Eyes: Conjunctivae, EOM and lids are normal. Pupils are equal, round, and reactive to light.   Neck: " Trachea normal and phonation normal. Neck supple.   Cardiovascular: Normal rate, regular rhythm and normal heart sounds.   Pulmonary/Chest: Effort normal and breath sounds normal. No stridor. No respiratory distress.   Musculoskeletal:      Right elbow: She exhibits decreased range of motion and swelling. She exhibits no deformity. Tenderness found.      Right wrist: She exhibits normal range of motion, no tenderness and no swelling.   Neurological: She is alert and oriented to person, place, and time.   Skin: Skin is warm, dry, intact and no rash. Capillary refill takes less than 2 seconds. No abrasion, No burn, No bruising, No erythema and No ecchymosis        Psychiatric: Her speech is normal and behavior is normal. Judgment and thought content normal.   Nursing note and vitals reviewed.      Assessment:     1. Closed fracture of right elbow, initial encounter    2. Injury of right elbow, initial encounter    3. Abrasion of left lower extremity, initial encounter        Plan:     EXAMINATION:  XR ELBOW COMPLETE 3 VIEW RIGHT     CLINICAL HISTORY:  . Unspecified injury of right elbow, initial encounter     TECHNIQUE:  AP, lateral, and oblique views of the right elbow were performed.     COMPARISON:  None     FINDINGS:  Raised anterior fat pad consistent with elbow joint effusion.  There is suspected nondisplaced fracture at the proximal radial head and neck junction.  No dislocation or destructive osseous process.  Minimal spurring of the olecranon lip.  No subcutaneous emphysema or radiopaque foreign body.     Impression:     Findings concerning for proximal radial head and neck junction acute nondisplaced fracture with associated elbow joint effusion.        Electronically signed by:Naresh Conley MD  Date:                                            07/11/2025  Time:                                           16:30      Closed fracture of right elbow, initial encounter  -     SPLINT FOR HOME USE  -     Ambulatory  referral/consult to Orthopedics  -     SLING FOR HOME USE  -     ibuprofen tablet 400 mg  -     ibuprofen (ADVIL,MOTRIN) 800 MG tablet; Take 1 tablet (800 mg total) by mouth 3 (three) times daily as needed for Pain.  Dispense: 30 tablet; Refill: 0    Injury of right elbow, initial encounter  -     XR ELBOW COMPLETE 3 VIEW RIGHT; Future; Expected date: 07/11/2025    Abrasion of left lower extremity, initial encounter  -     mupirocin (BACTROBAN) 2 % ointment; Apply topically once daily.  Dispense: 15 g; Refill: 0            Discussed results/diagnosis/plan with patient in clinic. Strict precautions given to patient to monitor for worsening signs and symptoms. Advised to follow up with PCP or specialist.  Explained side effects of medications prescribed with patient and informed him/her to discontinue use if he/she has any side effects and to inform UC or PCP if this occurs. All questions answered. Strict ED verses clinic return precautions stressed and given in depth. Advised if symptoms worsens of fail to improve he/she should go to the Emergency Room. Discharge and follow-up instructions given verbally/printed with the patient who expressed understanding and willingness to comply with my recommendations. Patient voiced understanding and in agreement with current treatment plan. Patient exits the exam room in no acute distress. Conversant and engaged during discharge discussion, verbalized understanding.

## 2025-07-11 NOTE — PATIENT INSTRUCTIONS
Keep the splint on.   Wear the sling for immobilization.  Rest and elevate the affected painful area.    Apply cold compresses intermittently as needed.    Avoid activities that cause pain.   As pain recedes, begin normal activities slowly as tolerated.      Alternate tylenol and ibuprofen every 4 hours as needed for pain. Always take ibuprofen with food and water to avoid GI upset. Stop taking if you develop abdominal pain or blood in stool.     Follow up with ortho in a few days. A referral was placed for you, call [phone] to schedule appointment.

## 2025-07-14 ENCOUNTER — HOSPITAL ENCOUNTER (OUTPATIENT)
Dept: RADIOLOGY | Facility: HOSPITAL | Age: 65
Discharge: HOME | End: 2025-07-14
Attending: PHYSICIAN ASSISTANT
Payer: MEDICAID

## 2025-07-14 ENCOUNTER — OFFICE VISIT (OUTPATIENT)
Dept: ORTHOPEDICS | Facility: CLINIC | Age: 65
End: 2025-07-14
Payer: MEDICAID

## 2025-07-14 VITALS — WEIGHT: 142.88 LBS | HEIGHT: 67 IN | BODY MASS INDEX: 22.43 KG/M2

## 2025-07-14 DIAGNOSIS — M25.531 RIGHT WRIST PAIN: ICD-10-CM

## 2025-07-14 DIAGNOSIS — M25.531 RIGHT WRIST PAIN: Primary | ICD-10-CM

## 2025-07-14 DIAGNOSIS — S52.124A CLOSED NONDISPLACED FRACTURE OF HEAD OF RIGHT RADIUS, INITIAL ENCOUNTER: ICD-10-CM

## 2025-07-14 PROCEDURE — 99204 OFFICE O/P NEW MOD 45 MIN: CPT | Mod: S$PBB,,, | Performed by: PHYSICIAN ASSISTANT

## 2025-07-14 PROCEDURE — 73110 X-RAY EXAM OF WRIST: CPT | Mod: 26,RT,, | Performed by: RADIOLOGY

## 2025-07-14 PROCEDURE — 73110 X-RAY EXAM OF WRIST: CPT | Mod: TC,RT

## 2025-07-14 PROCEDURE — 99999 PR PBB SHADOW E&M-EST. PATIENT-LVL III: CPT | Mod: PBBFAC,,, | Performed by: PHYSICIAN ASSISTANT

## 2025-07-14 PROCEDURE — 3008F BODY MASS INDEX DOCD: CPT | Mod: CPTII,,, | Performed by: PHYSICIAN ASSISTANT

## 2025-07-14 PROCEDURE — 99213 OFFICE O/P EST LOW 20 MIN: CPT | Mod: PBBFAC,25 | Performed by: PHYSICIAN ASSISTANT

## 2025-07-14 RX ORDER — OXYCODONE HYDROCHLORIDE 5 MG/1
5 TABLET ORAL EVERY 6 HOURS PRN
Qty: 28 TABLET | Refills: 0 | Status: SHIPPED | OUTPATIENT
Start: 2025-07-14 | End: 2025-07-21 | Stop reason: SDUPTHER

## 2025-07-14 RX ORDER — ACETAMINOPHEN 500 MG
1000 TABLET ORAL EVERY 8 HOURS
Qty: 180 TABLET | Refills: 0 | Status: SHIPPED | OUTPATIENT
Start: 2025-07-14 | End: 2025-08-13

## 2025-07-21 ENCOUNTER — OFFICE VISIT (OUTPATIENT)
Dept: ORTHOPEDICS | Facility: CLINIC | Age: 65
End: 2025-07-21
Payer: MEDICAID

## 2025-07-21 ENCOUNTER — HOSPITAL ENCOUNTER (OUTPATIENT)
Dept: RADIOLOGY | Facility: HOSPITAL | Age: 65
Discharge: HOME | End: 2025-07-21
Attending: PHYSICIAN ASSISTANT
Payer: MEDICAID

## 2025-07-21 VITALS — WEIGHT: 142.88 LBS | HEIGHT: 67 IN | BODY MASS INDEX: 22.43 KG/M2

## 2025-07-21 DIAGNOSIS — S52.124A CLOSED NONDISPLACED FRACTURE OF HEAD OF RIGHT RADIUS, INITIAL ENCOUNTER: ICD-10-CM

## 2025-07-21 DIAGNOSIS — M25.531 RIGHT WRIST PAIN: ICD-10-CM

## 2025-07-21 DIAGNOSIS — S52.124A CLOSED NONDISPLACED FRACTURE OF HEAD OF RIGHT RADIUS, INITIAL ENCOUNTER: Primary | ICD-10-CM

## 2025-07-21 PROCEDURE — 73110 X-RAY EXAM OF WRIST: CPT | Mod: TC,50

## 2025-07-21 PROCEDURE — 99999 PR PBB SHADOW E&M-EST. PATIENT-LVL IV: CPT | Mod: PBBFAC,,, | Performed by: PHYSICIAN ASSISTANT

## 2025-07-21 PROCEDURE — 73080 X-RAY EXAM OF ELBOW: CPT | Mod: TC,RT

## 2025-07-21 PROCEDURE — 3008F BODY MASS INDEX DOCD: CPT | Mod: CPTII,,, | Performed by: PHYSICIAN ASSISTANT

## 2025-07-21 PROCEDURE — 99213 OFFICE O/P EST LOW 20 MIN: CPT | Mod: S$PBB,,, | Performed by: PHYSICIAN ASSISTANT

## 2025-07-21 PROCEDURE — 73110 X-RAY EXAM OF WRIST: CPT | Mod: 26,50,, | Performed by: RADIOLOGY

## 2025-07-21 PROCEDURE — 99214 OFFICE O/P EST MOD 30 MIN: CPT | Mod: PBBFAC,25 | Performed by: PHYSICIAN ASSISTANT

## 2025-07-21 PROCEDURE — 1159F MED LIST DOCD IN RCRD: CPT | Mod: CPTII,,, | Performed by: PHYSICIAN ASSISTANT

## 2025-07-21 PROCEDURE — 73080 X-RAY EXAM OF ELBOW: CPT | Mod: 26,RT,, | Performed by: RADIOLOGY

## 2025-07-21 RX ORDER — OXYCODONE HYDROCHLORIDE 5 MG/1
5 TABLET ORAL EVERY 6 HOURS PRN
Qty: 28 TABLET | Refills: 0 | Status: SHIPPED | OUTPATIENT
Start: 2025-07-21 | End: 2025-07-28

## 2025-07-22 NOTE — PROGRESS NOTES
Subjective     Patient ID: Linda Kahn is a 65 y.o. female.    Chief Complaint: Pain and Injury of the Right Wrist and Pain of the Right Elbow    HPI    Patient is a 65 year old female who presented to [facility] on 07/11/25 after falling from standing.  RADS:   Elbow, right:Raised anterior fat pad consistent with elbow joint effusion. There is suspected nondisplaced fracture at the proximal radial head and neck junction.     Patient has been treated in a splint. She has been NWB. Reports her pain is not controlled. Denied numbness or tingling. Reports some right wrist pain.     Review of Systems   Constitutional: Negative for chills and fever.   Cardiovascular:  Negative for chest pain.   Respiratory:  Negative for cough and shortness of breath.    Skin:  Negative for color change, dry skin, itching, nail changes, poor wound healing and rash.   Musculoskeletal:  Positive for falls.        Radial head fracture    Neurological:  Negative for dizziness.   Psychiatric/Behavioral:  Negative for altered mental status. The patient is not nervous/anxious.    All other systems reviewed and are negative.         Objective     General    Constitutional: She is oriented to person, place, and time. She appears well-developed and well-nourished. No distress.   HENT:   Head: Atraumatic.   Eyes: Conjunctivae are normal.   Cardiovascular:  Normal rate.            Pulmonary/Chest: Effort normal.   Neurological: She is alert and oriented to person, place, and time.   Psychiatric: She has a normal mood and affect. Her behavior is normal.           Right Knee Exam     Comments:  Bruising    Left Knee Exam     Comments:  Bruising. Right Elbow Exam     Comments:  Presented to clinic with splint in place, removed for exam.   Range of motion 20- 110 with end point pain.   Pain with range of motion of her wrist.   NVI      Left Elbow Exam     Comments:              Physical Exam  Constitutional:       General: She is not in  acute distress.     Appearance: She is well-developed and well-nourished. She is not diaphoretic.   HENT:      Head: Atraumatic.   Eyes:      Conjunctiva/sclera: Conjunctivae normal.   Cardiovascular:      Rate and Rhythm: Normal rate.   Pulmonary:      Effort: Pulmonary effort is normal.   Neurological:      Mental Status: She is alert and oriented to person, place, and time.   Psychiatric:         Mood and Affect: Mood and affect normal.         Behavior: Behavior normal.          RADS:   Elbow: Raised anterior fat pad consistent with elbow joint effusion. There is suspected nondisplaced fracture at the proximal radial head and neck junction.     WRIST: Bony structures of the wrist are intact minimal degenerative changes seen at the 1st carpal-metacarpal joint space. No fractures are noted.        Assessment and Plan     Encounter Diagnoses   Name Primary?    Right wrist pain Yes    Closed nondisplaced fracture of head of right radius, initial encounter          Dicussed plan with the patient and x-ray findings.   Non-op treatment.   ROMAT, NWB, sling for comfort, encourage to be put of sling.   Pain medication: started on multimodal.  Return to clinic in 1 week at that time x-ray of her elbow is needed.

## 2025-07-22 NOTE — PROGRESS NOTES
Subjective     Patient ID: Linda Kahn is a 65 y.o. female.    Chief Complaint: Follow-up (Right wrist/elbow)    HPI    Patient is a 65 year old female who presented to [facility] on 07/11/25 after falling from standing.  RADS:   Elbow, right:Raised anterior fat pad consistent with elbow joint effusion. There is suspected nondisplaced fracture at the proximal radial head and neck junction.     Patient has been treated in a splint. She has been NWB. Reports her pain is not controlled. Denied numbness or tingling. Reports some right wrist pain.     Review of Systems   Constitutional: Negative for chills and fever.   Cardiovascular:  Negative for chest pain.   Respiratory:  Negative for cough and shortness of breath.    Skin:  Negative for color change, dry skin, itching, nail changes, poor wound healing and rash.   Musculoskeletal:  Positive for falls.        Radial head fracture    Neurological:  Negative for dizziness.   Psychiatric/Behavioral:  Negative for altered mental status. The patient is not nervous/anxious.    All other systems reviewed and are negative.         Objective     General    Constitutional: She is oriented to person, place, and time. She appears well-developed and well-nourished. No distress.   HENT:   Head: Atraumatic.   Eyes: Conjunctivae are normal.   Cardiovascular:  Normal rate.            Pulmonary/Chest: Effort normal.   Neurological: She is alert and oriented to person, place, and time.   Psychiatric: She has a normal mood and affect. Her behavior is normal.           Right Knee Exam     Comments:  Bruising    Left Knee Exam     Comments:  Bruising.     Right Hand/Wrist Exam     Comments:  Full range of motion  Mild TTP  ulnar side.       Left Elbow Exam     Comments:  Presented to clinic with sling in place, removed for exam.   Range of motion 20- 110 with end point pain.   Pain with range of motion of her wrist.   NVI            Physical Exam  Constitutional:        General: She is not in acute distress.     Appearance: She is well-developed and well-nourished. She is not diaphoretic.   HENT:      Head: Atraumatic.   Eyes:      Conjunctiva/sclera: Conjunctivae normal.   Cardiovascular:      Rate and Rhythm: Normal rate.   Pulmonary:      Effort: Pulmonary effort is normal.   Neurological:      Mental Status: She is alert and oriented to person, place, and time.   Psychiatric:         Mood and Affect: Mood and affect normal.         Behavior: Behavior normal.          RADS:   Elbow: Healing radial neck fracture in satisfactory position alignment. Mild DJD. No acute fracture or bone destruction identified. Slightly elevated anterior fat pad.      WRIST:   Left: No radiopaque foreign body or erosion seen.  No fracture, dislocation, or bone destruction seen.  There is mild DJD.     Right: No radiopaque foreign body or erosion seen.  No fracture, dislocation, or bone destruction seen.  There is mild DJD.       Assessment and Plan     Encounter Diagnoses   Name Primary?    Closed nondisplaced fracture of head of right radius, initial encounter Yes    Right wrist pain          Dicussed plan with the patient and x-ray findings.   Non-op treatment.   ROMAT, NWB, sling for comfort, encourage to be put of sling.   Order placed for PT  Pain medication: multimodal. Refilled   Return to clinic in 4 week at that time x-ray of her elbow is needed.

## 2025-08-18 ENCOUNTER — HOSPITAL ENCOUNTER (OUTPATIENT)
Dept: RADIOLOGY | Facility: HOSPITAL | Age: 65
Discharge: HOME | End: 2025-08-18
Attending: PHYSICIAN ASSISTANT
Payer: MEDICAID

## 2025-08-18 ENCOUNTER — OFFICE VISIT (OUTPATIENT)
Dept: ORTHOPEDICS | Facility: CLINIC | Age: 65
End: 2025-08-18
Payer: MEDICAID

## 2025-08-18 VITALS — HEIGHT: 67 IN | WEIGHT: 142.88 LBS | BODY MASS INDEX: 22.43 KG/M2

## 2025-08-18 DIAGNOSIS — S52.124A CLOSED NONDISPLACED FRACTURE OF HEAD OF RIGHT RADIUS, INITIAL ENCOUNTER: Primary | ICD-10-CM

## 2025-08-18 DIAGNOSIS — S52.124A CLOSED NONDISPLACED FRACTURE OF HEAD OF RIGHT RADIUS, INITIAL ENCOUNTER: ICD-10-CM

## 2025-08-18 PROCEDURE — 73080 X-RAY EXAM OF ELBOW: CPT | Mod: 26,RT,, | Performed by: RADIOLOGY

## 2025-08-18 PROCEDURE — 99213 OFFICE O/P EST LOW 20 MIN: CPT | Mod: PBBFAC,25 | Performed by: PHYSICIAN ASSISTANT

## 2025-08-18 PROCEDURE — 3008F BODY MASS INDEX DOCD: CPT | Mod: CPTII,,, | Performed by: PHYSICIAN ASSISTANT

## 2025-08-18 PROCEDURE — 99213 OFFICE O/P EST LOW 20 MIN: CPT | Mod: S$PBB,,, | Performed by: PHYSICIAN ASSISTANT

## 2025-08-18 PROCEDURE — 99999 PR PBB SHADOW E&M-EST. PATIENT-LVL III: CPT | Mod: PBBFAC,,, | Performed by: PHYSICIAN ASSISTANT

## 2025-08-18 PROCEDURE — 73080 X-RAY EXAM OF ELBOW: CPT | Mod: TC,RT

## 2025-08-18 PROCEDURE — 1159F MED LIST DOCD IN RCRD: CPT | Mod: CPTII,,, | Performed by: PHYSICIAN ASSISTANT
